# Patient Record
Sex: MALE | Race: WHITE | NOT HISPANIC OR LATINO
[De-identification: names, ages, dates, MRNs, and addresses within clinical notes are randomized per-mention and may not be internally consistent; named-entity substitution may affect disease eponyms.]

---

## 2017-03-08 ENCOUNTER — APPOINTMENT (OUTPATIENT)
Dept: VASCULAR SURGERY | Facility: CLINIC | Age: 57
End: 2017-03-08

## 2017-03-10 ENCOUNTER — APPOINTMENT (OUTPATIENT)
Dept: VASCULAR SURGERY | Facility: CLINIC | Age: 57
End: 2017-03-10

## 2017-03-29 ENCOUNTER — APPOINTMENT (OUTPATIENT)
Dept: VASCULAR SURGERY | Facility: CLINIC | Age: 57
End: 2017-03-29

## 2017-03-29 VITALS
SYSTOLIC BLOOD PRESSURE: 136 MMHG | HEART RATE: 73 BPM | BODY MASS INDEX: 33.33 KG/M2 | WEIGHT: 225 LBS | TEMPERATURE: 97.6 F | DIASTOLIC BLOOD PRESSURE: 84 MMHG | HEIGHT: 69 IN

## 2017-03-29 DIAGNOSIS — R60.0 LOCALIZED EDEMA: ICD-10-CM

## 2017-05-22 ENCOUNTER — APPOINTMENT (OUTPATIENT)
Dept: VASCULAR SURGERY | Facility: CLINIC | Age: 57
End: 2017-05-22

## 2017-05-22 ENCOUNTER — APPOINTMENT (OUTPATIENT)
Dept: SURGERY | Facility: CLINIC | Age: 57
End: 2017-05-22

## 2017-05-22 VITALS
TEMPERATURE: 98.6 F | DIASTOLIC BLOOD PRESSURE: 85 MMHG | WEIGHT: 225 LBS | HEIGHT: 69 IN | HEART RATE: 100 BPM | SYSTOLIC BLOOD PRESSURE: 133 MMHG | BODY MASS INDEX: 33.33 KG/M2

## 2017-05-22 DIAGNOSIS — K42.9 UMBILICAL HERNIA W/OUT OBSTRUCTION OR GANGRENE: ICD-10-CM

## 2017-05-22 DIAGNOSIS — E78.00 PURE HYPERCHOLESTEROLEMIA, UNSPECIFIED: ICD-10-CM

## 2017-05-25 ENCOUNTER — OUTPATIENT (OUTPATIENT)
Dept: OUTPATIENT SERVICES | Facility: HOSPITAL | Age: 57
LOS: 1 days | Discharge: HOME | End: 2017-05-25

## 2017-05-25 VITALS
DIASTOLIC BLOOD PRESSURE: 90 MMHG | WEIGHT: 238.1 LBS | HEIGHT: 69 IN | OXYGEN SATURATION: 100 % | TEMPERATURE: 97 F | SYSTOLIC BLOOD PRESSURE: 130 MMHG | RESPIRATION RATE: 18 BRPM | HEART RATE: 87 BPM

## 2017-05-25 DIAGNOSIS — F32.9 MAJOR DEPRESSIVE DISORDER, SINGLE EPISODE, UNSPECIFIED: ICD-10-CM

## 2017-05-25 DIAGNOSIS — I82.409 ACUTE EMBOLISM AND THROMBOSIS OF UNSPECIFIED DEEP VEINS OF UNSPECIFIED LOWER EXTREMITY: ICD-10-CM

## 2017-05-25 DIAGNOSIS — Z01.818 ENCOUNTER FOR OTHER PREPROCEDURAL EXAMINATION: ICD-10-CM

## 2017-05-25 DIAGNOSIS — K46.9 UNSPECIFIED ABDOMINAL HERNIA WITHOUT OBSTRUCTION OR GANGRENE: ICD-10-CM

## 2017-05-25 DIAGNOSIS — K21.9 GASTRO-ESOPHAGEAL REFLUX DISEASE WITHOUT ESOPHAGITIS: ICD-10-CM

## 2017-05-25 DIAGNOSIS — K42.9 UMBILICAL HERNIA WITHOUT OBSTRUCTION OR GANGRENE: ICD-10-CM

## 2017-05-25 DIAGNOSIS — E78.00 PURE HYPERCHOLESTEROLEMIA, UNSPECIFIED: ICD-10-CM

## 2017-05-25 LAB
ANION GAP SERPL CALC-SCNC: 10 MMOL/L — SIGNIFICANT CHANGE UP (ref 5–17)
BASOPHILS # BLD AUTO: 0.1 K/UL — SIGNIFICANT CHANGE UP (ref 0–0.2)
BASOPHILS NFR BLD AUTO: 1.7 % — SIGNIFICANT CHANGE UP (ref 0–2)
BUN SERPL-MCNC: 19 MG/DL — SIGNIFICANT CHANGE UP (ref 7–23)
CALCIUM SERPL-MCNC: 8.4 MG/DL — LOW (ref 8.5–10.1)
CHLORIDE SERPL-SCNC: 109 MMOL/L — HIGH (ref 96–108)
CO2 SERPL-SCNC: 22 MMOL/L — SIGNIFICANT CHANGE UP (ref 22–31)
CREAT SERPL-MCNC: 1.03 MG/DL — SIGNIFICANT CHANGE UP (ref 0.5–1.3)
EOSINOPHIL # BLD AUTO: 0.2 K/UL — SIGNIFICANT CHANGE UP (ref 0–0.5)
EOSINOPHIL NFR BLD AUTO: 3.7 % — SIGNIFICANT CHANGE UP (ref 0–6)
GLUCOSE SERPL-MCNC: 153 MG/DL — HIGH (ref 70–99)
HCT VFR BLD CALC: 43.3 % — SIGNIFICANT CHANGE UP (ref 39–50)
HGB BLD-MCNC: 15.3 G/DL — SIGNIFICANT CHANGE UP (ref 13–17)
HIV 1 & 2 AB SERPL IA.RAPID: SIGNIFICANT CHANGE UP
INR BLD: 0.92 RATIO — SIGNIFICANT CHANGE UP (ref 0.88–1.16)
LYMPHOCYTES # BLD AUTO: 1.8 K/UL — SIGNIFICANT CHANGE UP (ref 1–3.3)
LYMPHOCYTES # BLD AUTO: 31.7 % — SIGNIFICANT CHANGE UP (ref 13–44)
MCHC RBC-ENTMCNC: 31.7 PG — SIGNIFICANT CHANGE UP (ref 27–34)
MCHC RBC-ENTMCNC: 35.4 GM/DL — SIGNIFICANT CHANGE UP (ref 32–36)
MCV RBC AUTO: 89.5 FL — SIGNIFICANT CHANGE UP (ref 80–100)
MONOCYTES # BLD AUTO: 0.6 K/UL — SIGNIFICANT CHANGE UP (ref 0–0.9)
MONOCYTES NFR BLD AUTO: 10 % — SIGNIFICANT CHANGE UP (ref 2–14)
NEUTROPHILS # BLD AUTO: 3 K/UL — SIGNIFICANT CHANGE UP (ref 1.8–7.4)
NEUTROPHILS NFR BLD AUTO: 53 % — SIGNIFICANT CHANGE UP (ref 43–77)
PLATELET # BLD AUTO: 258 K/UL — SIGNIFICANT CHANGE UP (ref 150–400)
POTASSIUM SERPL-MCNC: 4.5 MMOL/L — SIGNIFICANT CHANGE UP (ref 3.5–5.3)
POTASSIUM SERPL-SCNC: 4.5 MMOL/L — SIGNIFICANT CHANGE UP (ref 3.5–5.3)
PROTHROM AB SERPL-ACNC: 10 SEC — SIGNIFICANT CHANGE UP (ref 9.8–12.7)
RBC # BLD: 4.83 M/UL — SIGNIFICANT CHANGE UP (ref 4.2–5.8)
RBC # FLD: 11 % — SIGNIFICANT CHANGE UP (ref 11–15)
SODIUM SERPL-SCNC: 141 MMOL/L — SIGNIFICANT CHANGE UP (ref 135–145)
WBC # BLD: 5.6 K/UL — SIGNIFICANT CHANGE UP (ref 3.8–10.5)
WBC # FLD AUTO: 5.6 K/UL — SIGNIFICANT CHANGE UP (ref 3.8–10.5)

## 2017-05-25 NOTE — H&P PST ADULT - PMH
Depressed    DVT (deep venous thrombosis)    GERD (gastroesophageal reflux disease)    Hypercholesteremia

## 2017-05-25 NOTE — H&P PST ADULT - NSANTHOSAYNRD_GEN_A_CORE
denies/No. CONNER screening performed.  STOP BANG Legend: 0-2 = LOW Risk; 3-4 = INTERMEDIATE Risk; 5-8 = HIGH Risk

## 2017-05-30 RX ORDER — SODIUM CHLORIDE 9 MG/ML
3 INJECTION INTRAMUSCULAR; INTRAVENOUS; SUBCUTANEOUS EVERY 8 HOURS
Qty: 0 | Refills: 0 | Status: DISCONTINUED | OUTPATIENT
Start: 2017-06-01 | End: 2017-06-01

## 2017-06-01 ENCOUNTER — OUTPATIENT (OUTPATIENT)
Dept: OUTPATIENT SERVICES | Facility: HOSPITAL | Age: 57
LOS: 1 days | Discharge: HOME | End: 2017-06-01
Payer: COMMERCIAL

## 2017-06-01 ENCOUNTER — TRANSCRIPTION ENCOUNTER (OUTPATIENT)
Age: 57
End: 2017-06-01

## 2017-06-01 ENCOUNTER — APPOINTMENT (OUTPATIENT)
Dept: SURGERY | Facility: HOSPITAL | Age: 57
End: 2017-06-01

## 2017-06-01 VITALS
SYSTOLIC BLOOD PRESSURE: 122 MMHG | OXYGEN SATURATION: 98 % | RESPIRATION RATE: 16 BRPM | HEART RATE: 66 BPM | DIASTOLIC BLOOD PRESSURE: 86 MMHG

## 2017-06-01 VITALS
HEIGHT: 69 IN | RESPIRATION RATE: 18 BRPM | WEIGHT: 238.1 LBS | SYSTOLIC BLOOD PRESSURE: 120 MMHG | HEART RATE: 68 BPM | DIASTOLIC BLOOD PRESSURE: 82 MMHG | TEMPERATURE: 97 F | OXYGEN SATURATION: 97 %

## 2017-06-01 PROCEDURE — 49568: CPT

## 2017-06-01 PROCEDURE — 49585: CPT | Mod: AS

## 2017-06-01 PROCEDURE — 49560: CPT

## 2017-06-01 RX ORDER — ACETAMINOPHEN 500 MG
1000 TABLET ORAL ONCE
Qty: 0 | Refills: 0 | Status: COMPLETED | OUTPATIENT
Start: 2017-06-01 | End: 2017-06-01

## 2017-06-01 RX ORDER — OXYCODONE HYDROCHLORIDE 5 MG/1
1 TABLET ORAL
Qty: 20 | Refills: 0
Start: 2017-06-01

## 2017-06-01 RX ORDER — FENTANYL CITRATE 50 UG/ML
50 INJECTION INTRAVENOUS
Qty: 0 | Refills: 0 | Status: DISCONTINUED | OUTPATIENT
Start: 2017-06-01 | End: 2017-06-01

## 2017-06-01 RX ORDER — SODIUM CHLORIDE 9 MG/ML
1000 INJECTION, SOLUTION INTRAVENOUS
Qty: 0 | Refills: 0 | Status: DISCONTINUED | OUTPATIENT
Start: 2017-06-01 | End: 2017-06-01

## 2017-06-01 RX ORDER — FENTANYL CITRATE 50 UG/ML
25 INJECTION INTRAVENOUS
Qty: 0 | Refills: 0 | Status: DISCONTINUED | OUTPATIENT
Start: 2017-06-01 | End: 2017-06-01

## 2017-06-01 RX ADMIN — FENTANYL CITRATE 25 MICROGRAM(S): 50 INJECTION INTRAVENOUS at 11:38

## 2017-06-01 RX ADMIN — Medication 400 MILLIGRAM(S): at 11:23

## 2017-06-01 RX ADMIN — Medication 1000 MILLIGRAM(S): at 11:53

## 2017-06-01 RX ADMIN — FENTANYL CITRATE 25 MICROGRAM(S): 50 INJECTION INTRAVENOUS at 11:23

## 2017-06-01 RX ADMIN — SODIUM CHLORIDE 75 MILLILITER(S): 9 INJECTION, SOLUTION INTRAVENOUS at 11:32

## 2017-06-01 NOTE — ASU DISCHARGE PLAN (ADULT/PEDIATRIC). - INSTRUCTIONS
may take over the counter stool softener when taking pain medication as needed for constipation please call his office to make an appointment

## 2017-06-01 NOTE — ASU DISCHARGE PLAN (ADULT/PEDIATRIC). - NOTIFY
Bleeding that does not stop/Numbness, color, or temperature change to extremity/Excessive Diarrhea/Inability to Tolerate Liquids or Foods/Numbness, tingling/Unable to Urinate/Fever greater than 101/Persistent Nausea and Vomiting/Swelling that continues/Increased Irritability or Sluggishness/Pain not relieved by Medications

## 2017-06-01 NOTE — ASU DISCHARGE PLAN (ADULT/PEDIATRIC). - MEDICATION SUMMARY - MEDICATIONS TO TAKE
I will START or STAY ON the medications listed below when I get home from the hospital:    acetaminophen-oxycodone 325 mg-5 mg oral tablet  -- 1 tab(s) by mouth every 6 hours, As Needed -for moderate pain MDD:4  -- Caution federal law prohibits the transfer of this drug to any person other  than the person for whom it was prescribed.  May cause drowsiness.  Alcohol may intensify this effect.  Use care when operating dangerous machinery.  This prescription cannot be refilled.  This product contains acetaminophen.  Do not use  with any other product containing acetaminophen to prevent possible liver damage.  Using more of this medication than prescribed may cause serious breathing problems.    -- Indication: For pain    sertraline 50 mg oral tablet  -- 1 tab(s) by mouth once a day  -- Indication: For depression    simvastatin 20 mg oral tablet  -- 1 tab(s) by mouth once a day  -- Indication: For hyperlipidemia     omeprazole 20 mg oral delayed release capsule  -- 1 cap(s) by mouth once a day, As Needed  -- Indication: For gerd

## 2017-06-06 ENCOUNTER — APPOINTMENT (OUTPATIENT)
Dept: MRI IMAGING | Facility: CLINIC | Age: 57
End: 2017-06-06

## 2017-06-06 DIAGNOSIS — E66.9 OBESITY, UNSPECIFIED: ICD-10-CM

## 2017-06-06 DIAGNOSIS — E78.00 PURE HYPERCHOLESTEROLEMIA, UNSPECIFIED: ICD-10-CM

## 2017-06-06 DIAGNOSIS — K21.9 GASTRO-ESOPHAGEAL REFLUX DISEASE WITHOUT ESOPHAGITIS: ICD-10-CM

## 2017-06-06 DIAGNOSIS — F32.9 MAJOR DEPRESSIVE DISORDER, SINGLE EPISODE, UNSPECIFIED: ICD-10-CM

## 2017-06-06 DIAGNOSIS — K43.9 VENTRAL HERNIA WITHOUT OBSTRUCTION OR GANGRENE: ICD-10-CM

## 2017-06-06 DIAGNOSIS — K42.9 UMBILICAL HERNIA WITHOUT OBSTRUCTION OR GANGRENE: ICD-10-CM

## 2017-06-11 ENCOUNTER — FORM ENCOUNTER (OUTPATIENT)
Age: 57
End: 2017-06-11

## 2017-06-12 ENCOUNTER — APPOINTMENT (OUTPATIENT)
Dept: MRI IMAGING | Facility: CLINIC | Age: 57
End: 2017-06-12

## 2017-06-12 ENCOUNTER — OUTPATIENT (OUTPATIENT)
Dept: OUTPATIENT SERVICES | Facility: HOSPITAL | Age: 57
LOS: 1 days | End: 2017-06-12
Payer: COMMERCIAL

## 2017-06-12 DIAGNOSIS — R60.0 LOCALIZED EDEMA: ICD-10-CM

## 2017-06-12 PROCEDURE — C8920: CPT

## 2017-06-12 PROCEDURE — C8902: CPT

## 2017-06-12 PROCEDURE — A9585: CPT

## 2017-06-14 ENCOUNTER — APPOINTMENT (OUTPATIENT)
Dept: SURGERY | Facility: CLINIC | Age: 57
End: 2017-06-14

## 2017-06-14 VITALS
BODY MASS INDEX: 29.62 KG/M2 | WEIGHT: 200 LBS | HEART RATE: 85 BPM | TEMPERATURE: 97.4 F | SYSTOLIC BLOOD PRESSURE: 137 MMHG | HEIGHT: 69 IN | DIASTOLIC BLOOD PRESSURE: 91 MMHG

## 2017-06-14 DIAGNOSIS — M79.89 OTHER SPECIFIED SOFT TISSUE DISORDERS: ICD-10-CM

## 2017-06-14 DIAGNOSIS — K46.9 UNSPECIFIED ABDOMINAL HERNIA W/OUT OBSTRUCTION OR GANGRENE: ICD-10-CM

## 2017-07-05 ENCOUNTER — APPOINTMENT (OUTPATIENT)
Dept: SURGERY | Facility: CLINIC | Age: 57
End: 2017-07-05

## 2018-08-01 ENCOUNTER — RX RENEWAL (OUTPATIENT)
Age: 58
End: 2018-08-01

## 2018-10-19 PROBLEM — F32.9 MAJOR DEPRESSIVE DISORDER, SINGLE EPISODE, UNSPECIFIED: Chronic | Status: ACTIVE | Noted: 2017-05-25

## 2018-10-19 PROBLEM — K21.9 GASTRO-ESOPHAGEAL REFLUX DISEASE WITHOUT ESOPHAGITIS: Chronic | Status: ACTIVE | Noted: 2017-05-25

## 2018-10-19 PROBLEM — E78.00 PURE HYPERCHOLESTEROLEMIA, UNSPECIFIED: Chronic | Status: ACTIVE | Noted: 2017-05-25

## 2018-10-19 PROBLEM — I82.409 ACUTE EMBOLISM AND THROMBOSIS OF UNSPECIFIED DEEP VEINS OF UNSPECIFIED LOWER EXTREMITY: Chronic | Status: ACTIVE | Noted: 2017-05-25

## 2018-11-24 ENCOUNTER — RECORD ABSTRACTING (OUTPATIENT)
Age: 58
End: 2018-11-24

## 2018-11-26 ENCOUNTER — APPOINTMENT (OUTPATIENT)
Dept: PULMONOLOGY | Facility: CLINIC | Age: 58
End: 2018-11-26
Payer: COMMERCIAL

## 2018-11-26 ENCOUNTER — LABORATORY RESULT (OUTPATIENT)
Age: 58
End: 2018-11-26

## 2018-11-26 ENCOUNTER — NON-APPOINTMENT (OUTPATIENT)
Age: 58
End: 2018-11-26

## 2018-11-26 VITALS
TEMPERATURE: 97.8 F | OXYGEN SATURATION: 94 % | RESPIRATION RATE: 12 BRPM | DIASTOLIC BLOOD PRESSURE: 95 MMHG | HEART RATE: 94 BPM | SYSTOLIC BLOOD PRESSURE: 130 MMHG

## 2018-11-26 VITALS — WEIGHT: 238 LBS | HEIGHT: 67 IN | BODY MASS INDEX: 37.35 KG/M2

## 2018-11-26 VITALS — SYSTOLIC BLOOD PRESSURE: 125 MMHG | DIASTOLIC BLOOD PRESSURE: 88 MMHG

## 2018-11-26 DIAGNOSIS — Z23 ENCOUNTER FOR IMMUNIZATION: ICD-10-CM

## 2018-11-26 DIAGNOSIS — N52.9 MALE ERECTILE DYSFUNCTION, UNSPECIFIED: ICD-10-CM

## 2018-11-26 DIAGNOSIS — R06.83 SNORING: ICD-10-CM

## 2018-11-26 LAB
ALBUMIN: 10
BILIRUB UR QL STRIP: NEGATIVE
CLARITY UR: CLEAR
COLLECTION METHOD: NORMAL
CREATININE: 200
GLUCOSE UR-MCNC: NEGATIVE
HCG UR QL: 0.2 EU/DL
HGB UR QL STRIP.AUTO: NEGATIVE
KETONES UR-MCNC: NEGATIVE
LEUKOCYTE ESTERASE UR QL STRIP: NEGATIVE
MICROALBUMIN/CREAT UR TEST STR-RTO: 30
NITRITE UR QL STRIP: NEGATIVE
PH UR STRIP: 5.5
PROT UR STRIP-MCNC: NEGATIVE
SP GR UR STRIP: 1.02

## 2018-11-26 PROCEDURE — 93000 ELECTROCARDIOGRAM COMPLETE: CPT

## 2018-11-26 PROCEDURE — 82044 UR ALBUMIN SEMIQUANTITATIVE: CPT | Mod: QW

## 2018-11-26 PROCEDURE — 81003 URINALYSIS AUTO W/O SCOPE: CPT | Mod: NC,QW

## 2018-11-26 PROCEDURE — 90686 IIV4 VACC NO PRSV 0.5 ML IM: CPT

## 2018-11-26 PROCEDURE — 99396 PREV VISIT EST AGE 40-64: CPT | Mod: 25

## 2018-11-26 PROCEDURE — 82570 ASSAY OF URINE CREATININE: CPT | Mod: QW

## 2018-11-26 PROCEDURE — 36415 COLL VENOUS BLD VENIPUNCTURE: CPT

## 2018-11-26 PROCEDURE — G0008: CPT

## 2018-12-03 ENCOUNTER — APPOINTMENT (OUTPATIENT)
Dept: PULMONOLOGY | Facility: CLINIC | Age: 58
End: 2018-12-03
Payer: COMMERCIAL

## 2018-12-03 PROCEDURE — 95800 SLP STDY UNATTENDED: CPT | Mod: 52

## 2018-12-04 ENCOUNTER — APPOINTMENT (OUTPATIENT)
Dept: GASTROENTEROLOGY | Facility: CLINIC | Age: 58
End: 2018-12-04
Payer: COMMERCIAL

## 2018-12-04 VITALS
BODY MASS INDEX: 35.47 KG/M2 | TEMPERATURE: 97.9 F | DIASTOLIC BLOOD PRESSURE: 78 MMHG | HEART RATE: 103 BPM | SYSTOLIC BLOOD PRESSURE: 130 MMHG | HEIGHT: 67 IN | WEIGHT: 226 LBS | OXYGEN SATURATION: 98 %

## 2018-12-04 DIAGNOSIS — K21.9 GASTRO-ESOPHAGEAL REFLUX DISEASE W/OUT ESOPHAGITIS: ICD-10-CM

## 2018-12-04 DIAGNOSIS — Z78.9 OTHER SPECIFIED HEALTH STATUS: ICD-10-CM

## 2018-12-04 DIAGNOSIS — Z12.11 ENCOUNTER FOR SCREENING FOR MALIGNANT NEOPLASM OF COLON: ICD-10-CM

## 2018-12-04 DIAGNOSIS — Z82.49 FAMILY HISTORY OF ISCHEMIC HEART DISEASE AND OTHER DISEASES OF THE CIRCULATORY SYSTEM: ICD-10-CM

## 2018-12-04 PROCEDURE — 95800 SLP STDY UNATTENDED: CPT | Mod: 52

## 2018-12-04 PROCEDURE — 99244 OFF/OP CNSLTJ NEW/EST MOD 40: CPT

## 2018-12-05 LAB
25(OH)D3 SERPL-MCNC: 27.4 NG/ML
ALBUMIN SERPL ELPH-MCNC: 4.8 G/DL
ALP BLD-CCNC: 71 U/L
ALT SERPL-CCNC: 24 U/L
ANION GAP SERPL CALC-SCNC: 12 MMOL/L
AST SERPL-CCNC: 20 U/L
BASOPHILS # BLD AUTO: 0.05 K/UL
BASOPHILS NFR BLD AUTO: 0.7 %
BILIRUB DIRECT SERPL-MCNC: 0.1 MG/DL
BILIRUB INDIRECT SERPL-MCNC: 0.2 MG/DL
BILIRUB SERPL-MCNC: 0.3 MG/DL
BUN SERPL-MCNC: 18 MG/DL
CALCIUM SERPL-MCNC: 10 MG/DL
CHLORIDE SERPL-SCNC: 105 MMOL/L
CHOLEST SERPL-MCNC: 201 MG/DL
CHOLEST/HDLC SERPL: 4.7 RATIO
CO2 SERPL-SCNC: 26 MMOL/L
CREAT SERPL-MCNC: 0.89 MG/DL
EOSINOPHIL # BLD AUTO: 0.1 K/UL
EOSINOPHIL NFR BLD AUTO: 1.4 %
GLUCOSE SERPL-MCNC: 90 MG/DL
HBA1C MFR BLD HPLC: 6 %
HCT VFR BLD CALC: 45.1 %
HCV AB SER QL: NONREACTIVE
HCV S/CO RATIO: 0.11 S/CO
HDLC SERPL-MCNC: 43 MG/DL
HGB BLD-MCNC: 14.8 G/DL
IMM GRANULOCYTES NFR BLD AUTO: 0.1 %
LDLC SERPL CALC-MCNC: 117 MG/DL
LYMPHOCYTES # BLD AUTO: 2.19 K/UL
LYMPHOCYTES NFR BLD AUTO: 31.2 %
MAN DIFF?: NORMAL
MCHC RBC-ENTMCNC: 30.1 PG
MCHC RBC-ENTMCNC: 32.8 GM/DL
MCV RBC AUTO: 91.9 FL
MONOCYTES # BLD AUTO: 0.58 K/UL
MONOCYTES NFR BLD AUTO: 8.3 %
NEUTROPHILS # BLD AUTO: 4.09 K/UL
NEUTROPHILS NFR BLD AUTO: 58.3 %
PLATELET # BLD AUTO: 278 K/UL
POTASSIUM SERPL-SCNC: 4.6 MMOL/L
PROT SERPL-MCNC: 7.2 G/DL
PSA SERPL-MCNC: 0.93 NG/ML
RBC # BLD: 4.91 M/UL
RBC # FLD: 12.5 %
SODIUM SERPL-SCNC: 143 MMOL/L
T3 SERPL-MCNC: 105 NG/DL
T3RU NFR SERPL: 1.08 INDEX
T4 FREE SERPL-MCNC: 1 NG/DL
T4 SERPL-MCNC: 4.6 UG/DL
TESTOST BND SERPL-MCNC: 5.1 PG/ML
TESTOST SERPL-MCNC: 328.6 NG/DL
TRIGL SERPL-MCNC: 205 MG/DL
TSH SERPL-ACNC: 1.27 UIU/ML
WBC # FLD AUTO: 7.02 K/UL

## 2018-12-15 PROBLEM — Z80.42 FAMILY HISTORY OF MALIGNANT NEOPLASM OF PROSTATE: Status: ACTIVE | Noted: 2018-12-15

## 2018-12-15 PROBLEM — Z11.59 ENCOUNTER FOR HCV SCREENING TEST FOR LOW RISK PATIENT: Status: ACTIVE | Noted: 2018-12-15

## 2018-12-15 PROBLEM — E78.5 OTHER AND UNSPECIFIED HYPERLIPIDEMIA: Status: ACTIVE | Noted: 2018-12-15

## 2018-12-15 PROBLEM — Z83.71 FAMILY HISTORY OF COLONIC POLYPS: Status: ACTIVE | Noted: 2018-12-15

## 2018-12-18 ENCOUNTER — APPOINTMENT (OUTPATIENT)
Dept: PULMONOLOGY | Facility: CLINIC | Age: 58
End: 2018-12-18

## 2018-12-18 ENCOUNTER — FORM ENCOUNTER (OUTPATIENT)
Age: 58
End: 2018-12-18

## 2018-12-19 ENCOUNTER — APPOINTMENT (OUTPATIENT)
Dept: PULMONOLOGY | Facility: CLINIC | Age: 58
End: 2018-12-19
Payer: COMMERCIAL

## 2018-12-19 VITALS
RESPIRATION RATE: 12 BRPM | DIASTOLIC BLOOD PRESSURE: 91 MMHG | HEART RATE: 114 BPM | OXYGEN SATURATION: 95 % | SYSTOLIC BLOOD PRESSURE: 138 MMHG

## 2018-12-19 DIAGNOSIS — Z83.71 FAMILY HISTORY OF COLONIC POLYPS: ICD-10-CM

## 2018-12-19 DIAGNOSIS — Z11.59 ENCOUNTER FOR SCREENING FOR OTHER VIRAL DISEASES: ICD-10-CM

## 2018-12-19 DIAGNOSIS — Z80.42 FAMILY HISTORY OF MALIGNANT NEOPLASM OF PROSTATE: ICD-10-CM

## 2018-12-19 DIAGNOSIS — E78.5 HYPERLIPIDEMIA, UNSPECIFIED: ICD-10-CM

## 2018-12-19 PROCEDURE — 99213 OFFICE O/P EST LOW 20 MIN: CPT

## 2019-01-23 ENCOUNTER — APPOINTMENT (OUTPATIENT)
Dept: GASTROENTEROLOGY | Facility: AMBULATORY MEDICAL SERVICES | Age: 59
End: 2019-01-23
Payer: COMMERCIAL

## 2019-01-23 PROCEDURE — 45378 DIAGNOSTIC COLONOSCOPY: CPT

## 2019-02-25 ENCOUNTER — APPOINTMENT (OUTPATIENT)
Dept: PULMONOLOGY | Facility: CLINIC | Age: 59
End: 2019-02-25
Payer: COMMERCIAL

## 2019-02-25 VITALS
HEIGHT: 69 IN | OXYGEN SATURATION: 95 % | WEIGHT: 225 LBS | DIASTOLIC BLOOD PRESSURE: 93 MMHG | SYSTOLIC BLOOD PRESSURE: 133 MMHG | BODY MASS INDEX: 33.33 KG/M2 | HEART RATE: 98 BPM | RESPIRATION RATE: 16 BRPM

## 2019-02-25 PROCEDURE — 99213 OFFICE O/P EST LOW 20 MIN: CPT

## 2019-02-25 NOTE — HISTORY OF PRESENT ILLNESS
[FreeTextEntry1] : Patient has his new auto cpap machine and having some difficulties with usage. Wife says he makes noises through his mouth while wearing nasal pillows\par \par Does feel better and has less daytime sleepiness \par \par Did not take machine with him on vacation for 1 week

## 2019-02-25 NOTE — PROCEDURE
[FreeTextEntry1] : cpap data downloaded and discussed with patient\par \par \par will check patient with a home oximetry on cpap

## 2019-02-25 NOTE — DISCUSSION/SUMMARY
[FreeTextEntry1] : Patient compliant to CPAP therapy and having positive clinical response to treatment. Will change setting s to 8-15 to see if helps with AHI\par \par encouraged to use nightly and bring on vacation. Next mask will change to full face mask

## 2019-05-03 ENCOUNTER — RX RENEWAL (OUTPATIENT)
Age: 59
End: 2019-05-03

## 2019-05-22 ENCOUNTER — OTHER (OUTPATIENT)
Age: 59
End: 2019-05-22

## 2019-05-22 ENCOUNTER — APPOINTMENT (OUTPATIENT)
Dept: PULMONOLOGY | Facility: CLINIC | Age: 59
End: 2019-05-22
Payer: COMMERCIAL

## 2019-05-22 VITALS
OXYGEN SATURATION: 95 % | SYSTOLIC BLOOD PRESSURE: 130 MMHG | HEART RATE: 96 BPM | DIASTOLIC BLOOD PRESSURE: 90 MMHG | RESPIRATION RATE: 16 BRPM

## 2019-05-22 VITALS — WEIGHT: 225 LBS | BODY MASS INDEX: 33.33 KG/M2 | HEIGHT: 69 IN

## 2019-05-22 PROCEDURE — 99213 OFFICE O/P EST LOW 20 MIN: CPT

## 2019-05-22 RX ORDER — SODIUM SULFATE, POTASSIUM SULFATE, MAGNESIUM SULFATE 17.5; 3.13; 1.6 G/ML; G/ML; G/ML
17.5-3.13-1.6 SOLUTION, CONCENTRATE ORAL
Qty: 1 | Refills: 0 | Status: DISCONTINUED | COMMUNITY
Start: 2018-12-04 | End: 2019-05-22

## 2019-05-22 NOTE — PHYSICAL EXAM
[Normal Conjunctiva] : the conjunctiva exhibited no abnormalities [Normal Oropharynx] : normal oropharynx [Jugular Venous Distention Increased] : there was no jugular-venous distention [Heart Sounds] : normal S1 and S2 [Murmurs] : no murmurs present [Auscultation Breath Sounds / Voice Sounds] : lungs were clear to auscultation bilaterally [Lungs Percussion] : the lungs were normal to percussion [Abdomen Soft] : soft [Abdomen Tenderness] : non-tender [Abdomen Mass (___ Cm)] : no abdominal mass palpated [Nail Clubbing] : no clubbing of the fingernails [Cyanosis, Localized] : no localized cyanosis [Petechial Hemorrhages (___cm)] : no petechial hemorrhages [] : no ischemic changes [FreeTextEntry1] : Boot right

## 2019-05-22 NOTE — HISTORY OF PRESENT ILLNESS
[FreeTextEntry1] : Having surgery Friday for fractured right foot 5th metatarsal . Feeling well\par \par For f/u re CONNER.\par When did oximetry on CPAP was not wearing CPAP properly.\par Now compliant to CPAP

## 2019-05-22 NOTE — ASSESSMENT
[FreeTextEntry1] : Medical problems as above. Medical status maximized. No medical contraindications to surgery.\par Pt with CONNER. Anesthesia should be aware of CONNER and take CONNER precautions.\par \par Fax

## 2019-05-22 NOTE — REASON FOR VISIT
[Follow-Up] : a follow-up visit [FreeTextEntry2] : medical clearance for right 5th metatarsal repair, [facility]

## 2019-06-03 ENCOUNTER — APPOINTMENT (OUTPATIENT)
Dept: PULMONOLOGY | Facility: CLINIC | Age: 59
End: 2019-06-03

## 2019-06-25 ENCOUNTER — APPOINTMENT (OUTPATIENT)
Dept: PULMONOLOGY | Facility: CLINIC | Age: 59
End: 2019-06-25
Payer: COMMERCIAL

## 2019-06-25 VITALS
OXYGEN SATURATION: 97 % | TEMPERATURE: 98.4 F | DIASTOLIC BLOOD PRESSURE: 72 MMHG | WEIGHT: 225 LBS | SYSTOLIC BLOOD PRESSURE: 108 MMHG | RESPIRATION RATE: 16 BRPM | BODY MASS INDEX: 33.33 KG/M2 | HEIGHT: 69 IN | HEART RATE: 107 BPM

## 2019-06-25 DIAGNOSIS — Z87.09 PERSONAL HISTORY OF OTHER DISEASES OF THE RESPIRATORY SYSTEM: ICD-10-CM

## 2019-06-25 LAB — S PYO AG SPEC QL IA: NEGATIVE

## 2019-06-25 PROCEDURE — 99213 OFFICE O/P EST LOW 20 MIN: CPT | Mod: 25

## 2019-06-25 PROCEDURE — 87880 STREP A ASSAY W/OPTIC: CPT | Mod: QW

## 2019-06-25 PROCEDURE — 36415 COLL VENOUS BLD VENIPUNCTURE: CPT

## 2019-06-25 NOTE — REASON FOR VISIT
[Follow-Up] : a follow-up visit [Sleep Apnea] : sleep apnea [FreeTextEntry2] : still in boot after sx, slowly getting better

## 2019-06-25 NOTE — PHYSICAL EXAM
[General Appearance - Well Nourished] : well nourished [Heart Sounds] : normal S1 and S2 [General Appearance - Well Developed] : well developed [Murmurs] : no murmurs present [Auscultation Breath Sounds / Voice Sounds] : lungs were clear to auscultation bilaterally [Abdomen Soft] : soft [Abdomen Tenderness] : non-tender [Abdomen Mass (___ Cm)] : no abdominal mass palpated [Cyanosis, Localized] : no localized cyanosis [Petechial Hemorrhages (___cm)] : no petechial hemorrhages [Nail Clubbing] : no clubbing of the fingernails [] : no ischemic changes [FreeTextEntry1] : Pharynx injected.

## 2019-06-25 NOTE — HISTORY OF PRESENT ILLNESS
[FreeTextEntry1] : Recently lost son to heroin overdose. past 2 days had trouble getting out of bed and has a sore throat. Had a lot of stress with end of life and

## 2019-06-25 NOTE — DISCUSSION/SUMMARY
[FreeTextEntry1] : Patient compliant to CPAP therapy and having positive clinical response to treatment. Continue present settings.\par \par \par will repeat apnea O2 at night with cpap to retest o2

## 2019-06-26 ENCOUNTER — OTHER (OUTPATIENT)
Age: 59
End: 2019-06-26

## 2019-06-26 LAB
BASOPHILS # BLD AUTO: 0.05 K/UL
BASOPHILS NFR BLD AUTO: 0.5 %
EOSINOPHIL # BLD AUTO: 0.08 K/UL
EOSINOPHIL NFR BLD AUTO: 0.9 %
HCT VFR BLD CALC: 42.9 %
HGB BLD-MCNC: 14.2 G/DL
IMM GRANULOCYTES NFR BLD AUTO: 0.2 %
LYMPHOCYTES # BLD AUTO: 1.8 K/UL
LYMPHOCYTES NFR BLD AUTO: 19.6 %
MAN DIFF?: NORMAL
MCHC RBC-ENTMCNC: 30 PG
MCHC RBC-ENTMCNC: 33.1 GM/DL
MCV RBC AUTO: 90.5 FL
MONOCYTES # BLD AUTO: 1.19 K/UL
MONOCYTES NFR BLD AUTO: 12.9 %
NEUTROPHILS # BLD AUTO: 6.05 K/UL
NEUTROPHILS NFR BLD AUTO: 65.9 %
PLATELET # BLD AUTO: 253 K/UL
RBC # BLD: 4.74 M/UL
RBC # FLD: 11.6 %
WBC # FLD AUTO: 9.19 K/UL

## 2019-07-22 ENCOUNTER — APPOINTMENT (OUTPATIENT)
Dept: VASCULAR SURGERY | Facility: CLINIC | Age: 59
End: 2019-07-22
Payer: COMMERCIAL

## 2019-07-22 VITALS
BODY MASS INDEX: 33.33 KG/M2 | SYSTOLIC BLOOD PRESSURE: 117 MMHG | WEIGHT: 225 LBS | HEART RATE: 108 BPM | HEIGHT: 69 IN | DIASTOLIC BLOOD PRESSURE: 82 MMHG

## 2019-07-22 PROCEDURE — 99213 OFFICE O/P EST LOW 20 MIN: CPT

## 2019-07-22 PROCEDURE — 93970 EXTREMITY STUDY: CPT

## 2019-07-30 ENCOUNTER — APPOINTMENT (OUTPATIENT)
Dept: VASCULAR SURGERY | Facility: CLINIC | Age: 59
End: 2019-07-30
Payer: COMMERCIAL

## 2019-07-30 VITALS
HEART RATE: 101 BPM | DIASTOLIC BLOOD PRESSURE: 96 MMHG | TEMPERATURE: 97.9 F | BODY MASS INDEX: 33.33 KG/M2 | SYSTOLIC BLOOD PRESSURE: 144 MMHG | HEIGHT: 69 IN | WEIGHT: 225 LBS | OXYGEN SATURATION: 97 %

## 2019-07-30 PROCEDURE — 99213 OFFICE O/P EST LOW 20 MIN: CPT

## 2019-08-05 ENCOUNTER — RX RENEWAL (OUTPATIENT)
Age: 59
End: 2019-08-05

## 2019-09-09 ENCOUNTER — APPOINTMENT (OUTPATIENT)
Dept: VASCULAR SURGERY | Facility: CLINIC | Age: 59
End: 2019-09-09

## 2019-09-12 NOTE — ASU PATIENT PROFILE, ADULT - PSH
Size Of Lesion: 6mm pink firm papule Size Of Lesion: 1cm pink firm nodule Detail Level: Detailed No significant past surgical history

## 2019-10-14 NOTE — H&P PST ADULT - URINARY CATHETER
Pt ambulated from bedside<>hallway ~25ftx2 - No AEs nec - very steady, No LOB/SOB ++tachycardic 112<>145 (MD team made aware for assessment) Pt asymptomatic throughout "I'm doing ok, I feel normal'
no

## 2019-11-05 ENCOUNTER — APPOINTMENT (OUTPATIENT)
Dept: VASCULAR SURGERY | Facility: CLINIC | Age: 59
End: 2019-11-05
Payer: COMMERCIAL

## 2019-11-05 ENCOUNTER — RX RENEWAL (OUTPATIENT)
Age: 59
End: 2019-11-05

## 2019-11-05 VITALS
SYSTOLIC BLOOD PRESSURE: 117 MMHG | OXYGEN SATURATION: 96 % | HEIGHT: 69 IN | TEMPERATURE: 98.5 F | DIASTOLIC BLOOD PRESSURE: 76 MMHG | HEART RATE: 75 BPM | BODY MASS INDEX: 0.15 KG/M2 | WEIGHT: 1 LBS

## 2019-11-05 PROCEDURE — 93971 EXTREMITY STUDY: CPT

## 2019-11-05 PROCEDURE — 99213 OFFICE O/P EST LOW 20 MIN: CPT

## 2019-11-12 ENCOUNTER — APPOINTMENT (OUTPATIENT)
Dept: VASCULAR SURGERY | Facility: CLINIC | Age: 59
End: 2019-11-12
Payer: COMMERCIAL

## 2019-11-12 PROCEDURE — 99213 OFFICE O/P EST LOW 20 MIN: CPT

## 2019-11-12 PROCEDURE — 93971 EXTREMITY STUDY: CPT

## 2019-12-03 ENCOUNTER — APPOINTMENT (OUTPATIENT)
Dept: VASCULAR SURGERY | Facility: CLINIC | Age: 59
End: 2019-12-03
Payer: COMMERCIAL

## 2019-12-03 PROCEDURE — 99213 OFFICE O/P EST LOW 20 MIN: CPT

## 2019-12-03 PROCEDURE — 93971 EXTREMITY STUDY: CPT

## 2019-12-17 ENCOUNTER — APPOINTMENT (OUTPATIENT)
Dept: PULMONOLOGY | Facility: CLINIC | Age: 59
End: 2019-12-17

## 2020-02-04 ENCOUNTER — APPOINTMENT (OUTPATIENT)
Dept: VASCULAR SURGERY | Facility: CLINIC | Age: 60
End: 2020-02-04
Payer: COMMERCIAL

## 2020-02-04 ENCOUNTER — APPOINTMENT (OUTPATIENT)
Dept: PULMONOLOGY | Facility: CLINIC | Age: 60
End: 2020-02-04
Payer: COMMERCIAL

## 2020-02-04 ENCOUNTER — NON-APPOINTMENT (OUTPATIENT)
Age: 60
End: 2020-02-04

## 2020-02-04 ENCOUNTER — LABORATORY RESULT (OUTPATIENT)
Age: 60
End: 2020-02-04

## 2020-02-04 VITALS — OXYGEN SATURATION: 96 % | SYSTOLIC BLOOD PRESSURE: 128 MMHG | DIASTOLIC BLOOD PRESSURE: 86 MMHG | HEART RATE: 104 BPM

## 2020-02-04 DIAGNOSIS — I83.892 VARICOSE VEINS OF LEFT LOWER EXTREMITY WITH OTHER COMPLICATIONS: ICD-10-CM

## 2020-02-04 DIAGNOSIS — G47.19 OTHER HYPERSOMNIA: ICD-10-CM

## 2020-02-04 DIAGNOSIS — R05 COUGH: ICD-10-CM

## 2020-02-04 PROCEDURE — 93000 ELECTROCARDIOGRAM COMPLETE: CPT

## 2020-02-04 PROCEDURE — 99212 OFFICE O/P EST SF 10 MIN: CPT

## 2020-02-04 PROCEDURE — 93971 EXTREMITY STUDY: CPT

## 2020-02-04 PROCEDURE — 36415 COLL VENOUS BLD VENIPUNCTURE: CPT

## 2020-02-04 PROCEDURE — 99396 PREV VISIT EST AGE 40-64: CPT | Mod: 25

## 2020-02-04 RX ORDER — DICLOFENAC SODIUM 75 MG/1
75 TABLET, DELAYED RELEASE ORAL
Qty: 30 | Refills: 1 | Status: DISCONTINUED | COMMUNITY
Start: 2019-07-22 | End: 2020-02-04

## 2020-02-04 RX ORDER — RIVAROXABAN 15 MG-20MG
15 & 20 KIT ORAL
Qty: 1 | Refills: 0 | Status: DISCONTINUED | COMMUNITY
Start: 2019-11-12 | End: 2020-02-04

## 2020-02-04 RX ORDER — AZITHROMYCIN 250 MG/1
250 TABLET, FILM COATED ORAL
Qty: 1 | Refills: 0 | Status: DISCONTINUED | COMMUNITY
Start: 2019-06-26 | End: 2020-02-04

## 2020-02-05 LAB
25(OH)D3 SERPL-MCNC: 27.5 NG/ML
ALBUMIN SERPL ELPH-MCNC: 4.9 G/DL
ALP BLD-CCNC: 75 U/L
ALT SERPL-CCNC: 25 U/L
ANION GAP SERPL CALC-SCNC: 14 MMOL/L
AST SERPL-CCNC: 20 U/L
BASOPHILS # BLD AUTO: 0.07 K/UL
BASOPHILS NFR BLD AUTO: 0.9 %
BILIRUB DIRECT SERPL-MCNC: 0.1 MG/DL
BILIRUB INDIRECT SERPL-MCNC: 0.2 MG/DL
BILIRUB SERPL-MCNC: 0.3 MG/DL
BUN SERPL-MCNC: 16 MG/DL
CALCIUM SERPL-MCNC: 9.9 MG/DL
CHLORIDE SERPL-SCNC: 102 MMOL/L
CHOLEST SERPL-MCNC: 190 MG/DL
CHOLEST/HDLC SERPL: 4.7 RATIO
CO2 SERPL-SCNC: 23 MMOL/L
CREAT SERPL-MCNC: 1.03 MG/DL
EOSINOPHIL # BLD AUTO: 0.18 K/UL
EOSINOPHIL NFR BLD AUTO: 2.4 %
ESTIMATED AVERAGE GLUCOSE: 126 MG/DL
GLUCOSE SERPL-MCNC: 90 MG/DL
HBA1C MFR BLD HPLC: 6 %
HCT VFR BLD CALC: 45.2 %
HCV AB SER QL: NONREACTIVE
HCV S/CO RATIO: 0.15 S/CO
HDLC SERPL-MCNC: 40 MG/DL
HGB BLD-MCNC: 14.8 G/DL
IMM GRANULOCYTES NFR BLD AUTO: 0.3 %
LDLC SERPL CALC-MCNC: 111 MG/DL
LYMPHOCYTES # BLD AUTO: 2.28 K/UL
LYMPHOCYTES NFR BLD AUTO: 30.4 %
MAN DIFF?: NORMAL
MCHC RBC-ENTMCNC: 30.5 PG
MCHC RBC-ENTMCNC: 32.7 GM/DL
MCV RBC AUTO: 93.2 FL
MONOCYTES # BLD AUTO: 0.76 K/UL
MONOCYTES NFR BLD AUTO: 10.1 %
NEUTROPHILS # BLD AUTO: 4.18 K/UL
NEUTROPHILS NFR BLD AUTO: 55.9 %
PLATELET # BLD AUTO: 277 K/UL
POTASSIUM SERPL-SCNC: 4.5 MMOL/L
PROT SERPL-MCNC: 7.5 G/DL
PSA SERPL-MCNC: 1.33 NG/ML
RBC # BLD: 4.85 M/UL
RBC # FLD: 12.2 %
SODIUM SERPL-SCNC: 138 MMOL/L
T3 SERPL-MCNC: 114 NG/DL
T3RU NFR SERPL: 1 TBI
T4 FREE SERPL-MCNC: 0.9 NG/DL
T4 SERPL-MCNC: 4.2 UG/DL
TRIGL SERPL-MCNC: 191 MG/DL
TSH SERPL-ACNC: 1.27 UIU/ML
WBC # FLD AUTO: 7.49 K/UL

## 2020-02-05 NOTE — ASSESSMENT
[FreeTextEntry1] : Weight loss recommended and discussed.\par Labs drawn in office today\par Follow BP\par Medications reviewed and renewed.\par \par \par \par \par

## 2020-02-05 NOTE — PHYSICAL EXAM
[General Appearance - Well Developed] : well developed [General Appearance - Well Nourished] : well nourished [Jugular Venous Distention Increased] : there was no jugular-venous distention [Heart Sounds] : normal S1 and S2 [Edema] : no peripheral edema present [Abdomen Soft] : soft [Auscultation Breath Sounds / Voice Sounds] : lungs were clear to auscultation bilaterally [Abdomen Tenderness] : non-tender [] : no hepato-splenomegaly [Nail Clubbing] : no clubbing of the fingernails [Cyanosis, Localized] : no localized cyanosis [No Acute Distress] : no acute distress [Normal Oropharynx] : normal oropharynx [Supple] : supple [Normal Appearance] : normal appearance [No Neck Mass] : no neck mass [No JVD] : no jvd [Thyroid Not Enlarged] : thyroid not enlarged [Clear to Auscultation Bilaterally] : clear to auscultation bilaterally [Normal S1, S2] : normal s1, s2 [No Murmurs] : no murmurs [Normal to Percussion] : normal to percussion [Benign] : benign [Not Tender] : not tender [No HSM] : no hsm [No Masses] : no masses [Normal Gait] : normal gait [No Clubbing] : no clubbing [Normal Color/ Pigmentation] : normal color/ pigmentation [No Edema] : no edema [No Cyanosis] : no cyanosis [Normal Affect] : normal affect [No Focal Deficits] : no focal deficits [Oriented x3] : oriented x3

## 2020-02-05 NOTE — HISTORY OF PRESENT ILLNESS
[Never] : never [TextBox_4] : cough for 1 week after runny nose now with green mucus no temp, no wheeze having coughing fits\par Colonoscopy 2 yr ago\par Optho due\par Derm  needs to go\par using cpap nightly seeing uro  doing well with nocturia  saw [facility] with help\par some GERD ran out of omeprazole

## 2020-04-30 ENCOUNTER — RX RENEWAL (OUTPATIENT)
Age: 60
End: 2020-04-30

## 2020-07-01 ENCOUNTER — APPOINTMENT (OUTPATIENT)
Dept: PULMONOLOGY | Facility: CLINIC | Age: 60
End: 2020-07-01

## 2020-12-06 ENCOUNTER — RX RENEWAL (OUTPATIENT)
Age: 60
End: 2020-12-06

## 2020-12-16 PROBLEM — Z12.11 ENCOUNTER FOR SCREENING COLONOSCOPY: Status: RESOLVED | Noted: 2018-12-04 | Resolved: 2020-12-16

## 2020-12-21 PROBLEM — Z87.09 HISTORY OF PHARYNGITIS: Status: RESOLVED | Noted: 2019-06-25 | Resolved: 2020-12-21

## 2021-01-29 NOTE — ASSESSMENT
Persistent tachycardia throughout the examination heart rate noted to be between 100 and 120 [FreeTextEntry1] : r/t in 3 months f/u

## 2021-03-12 ENCOUNTER — LABORATORY RESULT (OUTPATIENT)
Age: 61
End: 2021-03-12

## 2021-03-12 ENCOUNTER — NON-APPOINTMENT (OUTPATIENT)
Age: 61
End: 2021-03-12

## 2021-03-12 ENCOUNTER — APPOINTMENT (OUTPATIENT)
Dept: PULMONOLOGY | Facility: CLINIC | Age: 61
End: 2021-03-12
Payer: COMMERCIAL

## 2021-03-12 VITALS
RESPIRATION RATE: 16 BRPM | OXYGEN SATURATION: 95 % | SYSTOLIC BLOOD PRESSURE: 118 MMHG | DIASTOLIC BLOOD PRESSURE: 80 MMHG | TEMPERATURE: 97.6 F | BODY MASS INDEX: 37.47 KG/M2 | WEIGHT: 253 LBS | HEIGHT: 69 IN | HEART RATE: 89 BPM

## 2021-03-12 DIAGNOSIS — R41.3 OTHER AMNESIA: ICD-10-CM

## 2021-03-12 PROCEDURE — 99072 ADDL SUPL MATRL&STAF TM PHE: CPT

## 2021-03-12 PROCEDURE — 99396 PREV VISIT EST AGE 40-64: CPT | Mod: 25

## 2021-03-12 PROCEDURE — 93000 ELECTROCARDIOGRAM COMPLETE: CPT

## 2021-03-12 PROCEDURE — 36415 COLL VENOUS BLD VENIPUNCTURE: CPT

## 2021-03-12 RX ORDER — AZITHROMYCIN 250 MG/1
250 TABLET, FILM COATED ORAL
Qty: 1 | Refills: 1 | Status: DISCONTINUED | COMMUNITY
Start: 2020-02-04 | End: 2021-03-12

## 2021-03-12 RX ORDER — OMEPRAZOLE 20 MG/1
20 CAPSULE, DELAYED RELEASE ORAL
Refills: 0 | Status: DISCONTINUED | COMMUNITY
End: 2021-03-12

## 2021-03-12 RX ORDER — OMEPRAZOLE 40 MG/1
40 CAPSULE, DELAYED RELEASE ORAL
Qty: 30 | Refills: 5 | Status: DISCONTINUED | COMMUNITY
Start: 2020-02-04 | End: 2021-03-12

## 2021-03-12 NOTE — HISTORY OF PRESENT ILLNESS
[TextBox_4] : Colonoscopy 1/2019\par Optho not recent\par Derm not recent\par \par Patient thinks last visit in feb maybe had COVID. Had a cough and used Wixela for a short time.\par Saw Dr Adams over 1 year ago. Always have leg swelling in right leg\par \par Still grieving from sons death\par \par using Cpap night never got supplies\par  using pillows but wants full face\par \par family feels he should see neuro for memory changes, they feel he  asked the same questions twice, he has misplaced kety\par and eye glasses and wallet recently

## 2021-03-12 NOTE — ASSESSMENT
[FreeTextEntry1] : Weight loss recommended and discussed.\par Labs drawn in office today\par Medications reviewed and renewed.\par Due optho and derm.\par Send in chip. For CONNER\par Neuro evaluation\par \par \par \par

## 2021-03-12 NOTE — DISCUSSION/SUMMARY
[FreeTextEntry1] : BP borderline, good today\par CONNER\par GERD\par depression improved\par Overweight\par memory changes

## 2021-03-12 NOTE — PHYSICAL EXAM
[No Acute Distress] : no acute distress [Normal Oropharynx] : normal oropharynx [Normal Appearance] : normal appearance [Supple] : supple [Thyroid Not Enlarged] : thyroid not enlarged [No Neck Mass] : no neck mass [No JVD] : no jvd [Normal S1, S2] : normal s1, s2 [No Murmurs] : no murmurs [Clear to Auscultation Bilaterally] : clear to auscultation bilaterally [Normal to Percussion] : normal to percussion [Benign] : benign [Not Tender] : not tender [No Masses] : no masses [No HSM] : no hsm [Normal Gait] : normal gait [No Clubbing] : no clubbing [No Cyanosis] : no cyanosis [No Edema] : no edema [Normal Color/ Pigmentation] : normal color/ pigmentation [No Focal Deficits] : no focal deficits [Oriented x3] : oriented x3 [Normal Affect] : normal affect [General Appearance - Well Developed] : well developed [General Appearance - Well Nourished] : well nourished [Jugular Venous Distention Increased] : there was no jugular-venous distention [Heart Sounds] : normal S1 and S2 [Edema] : no peripheral edema present [Auscultation Breath Sounds / Voice Sounds] : lungs were clear to auscultation bilaterally [Abdomen Soft] : soft [Abdomen Tenderness] : non-tender [] : no hepato-splenomegaly [Nail Clubbing] : no clubbing of the fingernails [Cyanosis, Localized] : no localized cyanosis

## 2021-03-17 LAB
25(OH)D3 SERPL-MCNC: 27 NG/ML
ALBUMIN SERPL ELPH-MCNC: 4.7 G/DL
ALP BLD-CCNC: 74 U/L
ALT SERPL-CCNC: 28 U/L
ANION GAP SERPL CALC-SCNC: 16 MMOL/L
APPEARANCE: CLEAR
AST SERPL-CCNC: 18 U/L
BASOPHILS # BLD AUTO: 0.04 K/UL
BASOPHILS NFR BLD AUTO: 0.7 %
BILIRUB DIRECT SERPL-MCNC: 0.1 MG/DL
BILIRUB INDIRECT SERPL-MCNC: 0.3 MG/DL
BILIRUB SERPL-MCNC: 0.4 MG/DL
BILIRUBIN URINE: NEGATIVE
BLOOD URINE: NEGATIVE
BUN SERPL-MCNC: 20 MG/DL
CALCIUM SERPL-MCNC: 9.8 MG/DL
CHLORIDE SERPL-SCNC: 104 MMOL/L
CHOLEST SERPL-MCNC: 198 MG/DL
CO2 SERPL-SCNC: 20 MMOL/L
COLOR: YELLOW
CREAT SERPL-MCNC: 1.12 MG/DL
EOSINOPHIL # BLD AUTO: 0.1 K/UL
EOSINOPHIL NFR BLD AUTO: 1.7 %
ESTIMATED AVERAGE GLUCOSE: 123 MG/DL
FOLATE SERPL-MCNC: 15.7 NG/ML
GLUCOSE QUALITATIVE U: NEGATIVE
GLUCOSE SERPL-MCNC: 94 MG/DL
HBA1C MFR BLD HPLC: 5.9 %
HCT VFR BLD CALC: 43.5 %
HDLC SERPL-MCNC: 39 MG/DL
HGB BLD-MCNC: 14 G/DL
IMM GRANULOCYTES NFR BLD AUTO: 0.3 %
KETONES URINE: NEGATIVE
LDLC SERPL CALC-MCNC: 127 MG/DL
LEUKOCYTE ESTERASE URINE: NEGATIVE
LYMPHOCYTES # BLD AUTO: 2.19 K/UL
LYMPHOCYTES NFR BLD AUTO: 36.6 %
MAN DIFF?: NORMAL
MCHC RBC-ENTMCNC: 30.1 PG
MCHC RBC-ENTMCNC: 32.2 GM/DL
MCV RBC AUTO: 93.5 FL
MONOCYTES # BLD AUTO: 0.46 K/UL
MONOCYTES NFR BLD AUTO: 7.7 %
NEUTROPHILS # BLD AUTO: 3.18 K/UL
NEUTROPHILS NFR BLD AUTO: 53 %
NITRITE URINE: NEGATIVE
NONHDLC SERPL-MCNC: 159 MG/DL
PH URINE: 5.5
PLATELET # BLD AUTO: 256 K/UL
POTASSIUM SERPL-SCNC: 4.4 MMOL/L
PROT SERPL-MCNC: 7.4 G/DL
PROTEIN URINE: NEGATIVE
PSA SERPL-MCNC: 1.64 NG/ML
RBC # BLD: 4.65 M/UL
RBC # FLD: 11.9 %
SODIUM SERPL-SCNC: 139 MMOL/L
SPECIFIC GRAVITY URINE: 1.02
T3 SERPL-MCNC: 112 NG/DL
T3RU NFR SERPL: 1 TBI
T4 FREE SERPL-MCNC: 1 NG/DL
T4 SERPL-MCNC: 5.5 UG/DL
TRIGL SERPL-MCNC: 158 MG/DL
TSH SERPL-ACNC: 1.12 UIU/ML
UROBILINOGEN URINE: NORMAL
VIT B12 SERPL-MCNC: 570 PG/ML
WBC # FLD AUTO: 5.99 K/UL

## 2021-06-03 ENCOUNTER — RX RENEWAL (OUTPATIENT)
Age: 61
End: 2021-06-03

## 2021-09-10 ENCOUNTER — APPOINTMENT (OUTPATIENT)
Dept: PULMONOLOGY | Facility: CLINIC | Age: 61
End: 2021-09-10

## 2021-12-09 ENCOUNTER — RX RENEWAL (OUTPATIENT)
Age: 61
End: 2021-12-09

## 2022-02-04 ENCOUNTER — APPOINTMENT (OUTPATIENT)
Dept: PULMONOLOGY | Facility: CLINIC | Age: 62
End: 2022-02-04
Payer: COMMERCIAL

## 2022-02-04 VITALS
TEMPERATURE: 98.3 F | HEART RATE: 86 BPM | OXYGEN SATURATION: 96 % | DIASTOLIC BLOOD PRESSURE: 83 MMHG | SYSTOLIC BLOOD PRESSURE: 121 MMHG

## 2022-02-04 DIAGNOSIS — S49.91XA UNSPECIFIED INJURY OF RIGHT SHOULDER AND UPPER ARM, INITIAL ENCOUNTER: ICD-10-CM

## 2022-02-04 PROCEDURE — 99214 OFFICE O/P EST MOD 30 MIN: CPT

## 2022-02-06 NOTE — DISCUSSION/SUMMARY
[FreeTextEntry1] : Preop for right shoulder rotator cuff surgery.\par CONNER by history compliant to CPAP\par Hyperlipidemia on therapy.\par Overweight.\par

## 2022-02-06 NOTE — ASSESSMENT
[FreeTextEntry1] : Obtain CPAP data.\par Continue present CPAP settings.\par \par 35 minutes spent in review examination and discussion.\par \par Obtain presurgical testing and subsequent clearance.\par \par \par Addendum: Labs and ekg reviewed.\par \par Medical problems as above. Medical status maximized. No medical contraindications to surgery.\par Pt with CONNER. Anesthesia should be aware of CONNER and take CONNER precautions.\par

## 2022-02-06 NOTE — HISTORY OF PRESENT ILLNESS
[TextBox_4] : Preop for rotator cuff surgery right shoulder on 7/10/22\par \par Feeling well.\par CONNER compliant to CPAP.\par \par Denies recent upper respiratory infections.\par Denies chest pain or shortness of breath.\par \par \par \par

## 2022-02-28 ENCOUNTER — EMERGENCY (EMERGENCY)
Facility: HOSPITAL | Age: 62
LOS: 1 days | Discharge: ANOTHER HOSPITAL | End: 2022-02-28
Attending: STUDENT IN AN ORGANIZED HEALTH CARE EDUCATION/TRAINING PROGRAM | Admitting: STUDENT IN AN ORGANIZED HEALTH CARE EDUCATION/TRAINING PROGRAM
Payer: COMMERCIAL

## 2022-02-28 ENCOUNTER — INPATIENT (INPATIENT)
Facility: HOSPITAL | Age: 62
LOS: 2 days | Discharge: HOME | DRG: 163 | End: 2022-03-03
Attending: HOSPITALIST | Admitting: HOSPITALIST
Payer: COMMERCIAL

## 2022-02-28 VITALS
SYSTOLIC BLOOD PRESSURE: 131 MMHG | TEMPERATURE: 99 F | HEART RATE: 134 BPM | RESPIRATION RATE: 20 BRPM | HEIGHT: 69 IN | WEIGHT: 244.93 LBS | OXYGEN SATURATION: 91 % | DIASTOLIC BLOOD PRESSURE: 90 MMHG

## 2022-02-28 VITALS
TEMPERATURE: 98 F | SYSTOLIC BLOOD PRESSURE: 104 MMHG | RESPIRATION RATE: 20 BRPM | HEIGHT: 69 IN | OXYGEN SATURATION: 93 % | HEART RATE: 122 BPM | DIASTOLIC BLOOD PRESSURE: 69 MMHG

## 2022-02-28 VITALS
DIASTOLIC BLOOD PRESSURE: 51 MMHG | RESPIRATION RATE: 16 BRPM | OXYGEN SATURATION: 96 % | SYSTOLIC BLOOD PRESSURE: 101 MMHG | HEART RATE: 126 BPM | TEMPERATURE: 98 F

## 2022-02-28 DIAGNOSIS — I26.99 OTHER PULMONARY EMBOLISM WITHOUT ACUTE COR PULMONALE: ICD-10-CM

## 2022-02-28 DIAGNOSIS — Z98.890 OTHER SPECIFIED POSTPROCEDURAL STATES: Chronic | ICD-10-CM

## 2022-02-28 LAB
ALBUMIN SERPL ELPH-MCNC: 3.9 G/DL — SIGNIFICANT CHANGE UP (ref 3.3–5)
ALP SERPL-CCNC: 120 U/L — SIGNIFICANT CHANGE UP (ref 40–120)
ALT FLD-CCNC: 28 U/L — SIGNIFICANT CHANGE UP (ref 12–78)
ANION GAP SERPL CALC-SCNC: 8 MMOL/L — SIGNIFICANT CHANGE UP (ref 5–17)
APTT BLD: 149.6 SEC — CRITICAL HIGH (ref 27.5–35.5)
APTT BLD: 32.2 SEC — SIGNIFICANT CHANGE UP (ref 27.5–35.5)
APTT BLD: 84.9 SEC — HIGH (ref 27.5–35.5)
AST SERPL-CCNC: 15 U/L — SIGNIFICANT CHANGE UP (ref 15–37)
BASOPHILS # BLD AUTO: 0.07 K/UL — SIGNIFICANT CHANGE UP (ref 0–0.2)
BASOPHILS # BLD AUTO: 0.07 K/UL — SIGNIFICANT CHANGE UP (ref 0–0.2)
BASOPHILS NFR BLD AUTO: 0.7 % — SIGNIFICANT CHANGE UP (ref 0–2)
BASOPHILS NFR BLD AUTO: 0.7 % — SIGNIFICANT CHANGE UP (ref 0–2)
BILIRUB SERPL-MCNC: 0.6 MG/DL — SIGNIFICANT CHANGE UP (ref 0.2–1.2)
BLD GP AB SCN SERPL QL: SIGNIFICANT CHANGE UP
BUN SERPL-MCNC: 17 MG/DL — SIGNIFICANT CHANGE UP (ref 7–23)
CALCIUM SERPL-MCNC: 9.4 MG/DL — SIGNIFICANT CHANGE UP (ref 8.5–10.1)
CHLORIDE SERPL-SCNC: 108 MMOL/L — SIGNIFICANT CHANGE UP (ref 96–108)
CO2 SERPL-SCNC: 22 MMOL/L — SIGNIFICANT CHANGE UP (ref 22–31)
CREAT SERPL-MCNC: 1.2 MG/DL — SIGNIFICANT CHANGE UP (ref 0.5–1.3)
D DIMER BLD IA.RAPID-MCNC: 2929 NG/ML DDU — HIGH
EGFR: 69 ML/MIN/1.73M2 — SIGNIFICANT CHANGE UP
EOSINOPHIL # BLD AUTO: 0.06 K/UL — SIGNIFICANT CHANGE UP (ref 0–0.5)
EOSINOPHIL # BLD AUTO: 0.11 K/UL — SIGNIFICANT CHANGE UP (ref 0–0.5)
EOSINOPHIL NFR BLD AUTO: 0.6 % — SIGNIFICANT CHANGE UP (ref 0–6)
EOSINOPHIL NFR BLD AUTO: 1.1 % — SIGNIFICANT CHANGE UP (ref 0–6)
FLUAV AG NPH QL: SIGNIFICANT CHANGE UP
FLUBV AG NPH QL: SIGNIFICANT CHANGE UP
GLUCOSE SERPL-MCNC: 163 MG/DL — HIGH (ref 70–99)
HCT VFR BLD CALC: 42.8 % — SIGNIFICANT CHANGE UP (ref 39–50)
HCT VFR BLD CALC: 43.6 % — SIGNIFICANT CHANGE UP (ref 39–50)
HGB BLD-MCNC: 14.4 G/DL — SIGNIFICANT CHANGE UP (ref 13–17)
HGB BLD-MCNC: 15.3 G/DL — SIGNIFICANT CHANGE UP (ref 13–17)
IMM GRANULOCYTES NFR BLD AUTO: 0.4 % — SIGNIFICANT CHANGE UP (ref 0–1.5)
IMM GRANULOCYTES NFR BLD AUTO: 0.4 % — SIGNIFICANT CHANGE UP (ref 0–1.5)
INR BLD: 1.11 RATIO — SIGNIFICANT CHANGE UP (ref 0.88–1.16)
INR BLD: 1.14 RATIO — SIGNIFICANT CHANGE UP (ref 0.88–1.16)
LACTATE BLDV-MCNC: 2.1 MMOL/L — HIGH (ref 0.5–2)
LYMPHOCYTES # BLD AUTO: 1.58 K/UL — SIGNIFICANT CHANGE UP (ref 1–3.3)
LYMPHOCYTES # BLD AUTO: 16.2 % — SIGNIFICANT CHANGE UP (ref 13–44)
LYMPHOCYTES # BLD AUTO: 2.13 K/UL — SIGNIFICANT CHANGE UP (ref 1–3.3)
LYMPHOCYTES # BLD AUTO: 20.3 % — SIGNIFICANT CHANGE UP (ref 13–44)
MCHC RBC-ENTMCNC: 29.9 PG — SIGNIFICANT CHANGE UP (ref 27–34)
MCHC RBC-ENTMCNC: 30.6 PG — SIGNIFICANT CHANGE UP (ref 27–34)
MCHC RBC-ENTMCNC: 33.6 GM/DL — SIGNIFICANT CHANGE UP (ref 32–36)
MCHC RBC-ENTMCNC: 35.1 GM/DL — SIGNIFICANT CHANGE UP (ref 32–36)
MCV RBC AUTO: 87.2 FL — SIGNIFICANT CHANGE UP (ref 80–100)
MCV RBC AUTO: 88.8 FL — SIGNIFICANT CHANGE UP (ref 80–100)
MONOCYTES # BLD AUTO: 0.75 K/UL — SIGNIFICANT CHANGE UP (ref 0–0.9)
MONOCYTES # BLD AUTO: 0.9 K/UL — SIGNIFICANT CHANGE UP (ref 0–0.9)
MONOCYTES NFR BLD AUTO: 7.7 % — SIGNIFICANT CHANGE UP (ref 2–14)
MONOCYTES NFR BLD AUTO: 8.6 % — SIGNIFICANT CHANGE UP (ref 2–14)
NEUTROPHILS # BLD AUTO: 7.22 K/UL — SIGNIFICANT CHANGE UP (ref 1.8–7.4)
NEUTROPHILS # BLD AUTO: 7.24 K/UL — SIGNIFICANT CHANGE UP (ref 1.8–7.4)
NEUTROPHILS NFR BLD AUTO: 68.9 % — SIGNIFICANT CHANGE UP (ref 43–77)
NEUTROPHILS NFR BLD AUTO: 74.4 % — SIGNIFICANT CHANGE UP (ref 43–77)
NRBC # BLD: 0 /100 WBCS — SIGNIFICANT CHANGE UP (ref 0–0)
NT-PROBNP SERPL-SCNC: 392 PG/ML — HIGH (ref 0–125)
PLATELET # BLD AUTO: 250 K/UL — SIGNIFICANT CHANGE UP (ref 150–400)
PLATELET # BLD AUTO: 287 K/UL — SIGNIFICANT CHANGE UP (ref 150–400)
POTASSIUM SERPL-MCNC: 4.2 MMOL/L — SIGNIFICANT CHANGE UP (ref 3.5–5.3)
POTASSIUM SERPL-SCNC: 4.2 MMOL/L — SIGNIFICANT CHANGE UP (ref 3.5–5.3)
PROT SERPL-MCNC: 7.7 G/DL — SIGNIFICANT CHANGE UP (ref 6–8.3)
PROTHROM AB SERPL-ACNC: 13 SEC — SIGNIFICANT CHANGE UP (ref 10.5–13.4)
PROTHROM AB SERPL-ACNC: 13.2 SEC — SIGNIFICANT CHANGE UP (ref 10.5–13.4)
RBC # BLD: 4.82 M/UL — SIGNIFICANT CHANGE UP (ref 4.2–5.8)
RBC # BLD: 5 M/UL — SIGNIFICANT CHANGE UP (ref 4.2–5.8)
RBC # FLD: 11.6 % — SIGNIFICANT CHANGE UP (ref 10.3–14.5)
RBC # FLD: 11.7 % — SIGNIFICANT CHANGE UP (ref 10.3–14.5)
RSV RNA NPH QL NAA+NON-PROBE: SIGNIFICANT CHANGE UP
SARS-COV-2 RNA SPEC QL NAA+PROBE: SIGNIFICANT CHANGE UP
SARS-COV-2 RNA SPEC QL NAA+PROBE: SIGNIFICANT CHANGE UP
SODIUM SERPL-SCNC: 138 MMOL/L — SIGNIFICANT CHANGE UP (ref 135–145)
TROPONIN I, HIGH SENSITIVITY RESULT: 1045.1 NG/L — HIGH
WBC # BLD: 10.47 K/UL — SIGNIFICANT CHANGE UP (ref 3.8–10.5)
WBC # BLD: 9.74 K/UL — SIGNIFICANT CHANGE UP (ref 3.8–10.5)
WBC # FLD AUTO: 10.47 K/UL — SIGNIFICANT CHANGE UP (ref 3.8–10.5)
WBC # FLD AUTO: 9.74 K/UL — SIGNIFICANT CHANGE UP (ref 3.8–10.5)

## 2022-02-28 PROCEDURE — 99285 EMERGENCY DEPT VISIT HI MDM: CPT | Mod: 25

## 2022-02-28 PROCEDURE — 99223 1ST HOSP IP/OBS HIGH 75: CPT

## 2022-02-28 PROCEDURE — 36415 COLL VENOUS BLD VENIPUNCTURE: CPT

## 2022-02-28 PROCEDURE — 99222 1ST HOSP IP/OBS MODERATE 55: CPT

## 2022-02-28 PROCEDURE — 96374 THER/PROPH/DIAG INJ IV PUSH: CPT | Mod: XU

## 2022-02-28 PROCEDURE — 93005 ELECTROCARDIOGRAM TRACING: CPT

## 2022-02-28 PROCEDURE — 85610 PROTHROMBIN TIME: CPT

## 2022-02-28 PROCEDURE — 71275 CT ANGIOGRAPHY CHEST: CPT | Mod: MA

## 2022-02-28 PROCEDURE — 85025 COMPLETE CBC W/AUTO DIFF WBC: CPT

## 2022-02-28 PROCEDURE — 99285 EMERGENCY DEPT VISIT HI MDM: CPT

## 2022-02-28 PROCEDURE — 71275 CT ANGIOGRAPHY CHEST: CPT | Mod: 26,MA

## 2022-02-28 PROCEDURE — 83880 ASSAY OF NATRIURETIC PEPTIDE: CPT

## 2022-02-28 PROCEDURE — 85379 FIBRIN DEGRADATION QUANT: CPT

## 2022-02-28 PROCEDURE — 85730 THROMBOPLASTIN TIME PARTIAL: CPT

## 2022-02-28 PROCEDURE — 80053 COMPREHEN METABOLIC PANEL: CPT

## 2022-02-28 PROCEDURE — 93010 ELECTROCARDIOGRAM REPORT: CPT

## 2022-02-28 PROCEDURE — 84484 ASSAY OF TROPONIN QUANT: CPT

## 2022-02-28 PROCEDURE — 71045 X-RAY EXAM CHEST 1 VIEW: CPT

## 2022-02-28 PROCEDURE — 87635 SARS-COV-2 COVID-19 AMP PRB: CPT

## 2022-02-28 PROCEDURE — 71045 X-RAY EXAM CHEST 1 VIEW: CPT | Mod: 26

## 2022-02-28 PROCEDURE — 99291 CRITICAL CARE FIRST HOUR: CPT

## 2022-02-28 RX ORDER — HEPARIN SODIUM 5000 [USP'U]/ML
9000 INJECTION INTRAVENOUS; SUBCUTANEOUS EVERY 6 HOURS
Refills: 0 | Status: DISCONTINUED | OUTPATIENT
Start: 2022-02-28 | End: 2022-03-01

## 2022-02-28 RX ORDER — HEPARIN SODIUM 5000 [USP'U]/ML
4500 INJECTION INTRAVENOUS; SUBCUTANEOUS EVERY 6 HOURS
Refills: 0 | Status: DISCONTINUED | OUTPATIENT
Start: 2022-02-28 | End: 2022-03-01

## 2022-02-28 RX ORDER — SIMVASTATIN 20 MG/1
1 TABLET, FILM COATED ORAL
Qty: 0 | Refills: 0 | DISCHARGE

## 2022-02-28 RX ORDER — ATORVASTATIN CALCIUM 80 MG/1
20 TABLET, FILM COATED ORAL AT BEDTIME
Refills: 0 | Status: DISCONTINUED | OUTPATIENT
Start: 2022-02-28 | End: 2022-03-03

## 2022-02-28 RX ORDER — SERTRALINE 25 MG/1
50 TABLET, FILM COATED ORAL DAILY
Refills: 0 | Status: DISCONTINUED | OUTPATIENT
Start: 2022-02-28 | End: 2022-03-03

## 2022-02-28 RX ORDER — HEPARIN SODIUM 5000 [USP'U]/ML
4500 INJECTION INTRAVENOUS; SUBCUTANEOUS EVERY 6 HOURS
Refills: 0 | Status: DISCONTINUED | OUTPATIENT
Start: 2022-02-28 | End: 2022-03-03

## 2022-02-28 RX ORDER — HEPARIN SODIUM 5000 [USP'U]/ML
INJECTION INTRAVENOUS; SUBCUTANEOUS
Qty: 25000 | Refills: 0 | Status: DISCONTINUED | OUTPATIENT
Start: 2022-02-28 | End: 2022-02-28

## 2022-02-28 RX ORDER — HEPARIN SODIUM 5000 [USP'U]/ML
1600 INJECTION INTRAVENOUS; SUBCUTANEOUS
Qty: 25000 | Refills: 0 | Status: DISCONTINUED | OUTPATIENT
Start: 2022-02-28 | End: 2022-03-01

## 2022-02-28 RX ORDER — OXYCODONE AND ACETAMINOPHEN 5; 325 MG/1; MG/1
1 TABLET ORAL EVERY 6 HOURS
Refills: 0 | Status: DISCONTINUED | OUTPATIENT
Start: 2022-02-28 | End: 2022-03-02

## 2022-02-28 RX ORDER — HEPARIN SODIUM 5000 [USP'U]/ML
9000 INJECTION INTRAVENOUS; SUBCUTANEOUS EVERY 6 HOURS
Refills: 0 | Status: DISCONTINUED | OUTPATIENT
Start: 2022-02-28 | End: 2022-03-03

## 2022-02-28 RX ORDER — HEPARIN SODIUM 5000 [USP'U]/ML
INJECTION INTRAVENOUS; SUBCUTANEOUS
Qty: 25000 | Refills: 0 | Status: DISCONTINUED | OUTPATIENT
Start: 2022-02-28 | End: 2022-03-03

## 2022-02-28 RX ORDER — ACETAMINOPHEN 500 MG
650 TABLET ORAL EVERY 6 HOURS
Refills: 0 | Status: DISCONTINUED | OUTPATIENT
Start: 2022-02-28 | End: 2022-03-03

## 2022-02-28 RX ORDER — HEPARIN SODIUM 5000 [USP'U]/ML
1600 INJECTION INTRAVENOUS; SUBCUTANEOUS
Qty: 25000 | Refills: 0 | Status: DISCONTINUED | OUTPATIENT
Start: 2022-02-28 | End: 2022-02-28

## 2022-02-28 RX ORDER — SERTRALINE 25 MG/1
1 TABLET, FILM COATED ORAL
Qty: 0 | Refills: 0 | DISCHARGE

## 2022-02-28 RX ORDER — ONDANSETRON 8 MG/1
4 TABLET, FILM COATED ORAL EVERY 8 HOURS
Refills: 0 | Status: DISCONTINUED | OUTPATIENT
Start: 2022-02-28 | End: 2022-03-01

## 2022-02-28 RX ORDER — SODIUM CHLORIDE 9 MG/ML
1000 INJECTION, SOLUTION INTRAVENOUS
Refills: 0 | Status: DISCONTINUED | OUTPATIENT
Start: 2022-02-28 | End: 2022-03-01

## 2022-02-28 RX ORDER — SODIUM CHLORIDE 9 MG/ML
1000 INJECTION INTRAMUSCULAR; INTRAVENOUS; SUBCUTANEOUS ONCE
Refills: 0 | Status: COMPLETED | OUTPATIENT
Start: 2022-02-28 | End: 2022-02-28

## 2022-02-28 RX ORDER — HEPARIN SODIUM 5000 [USP'U]/ML
9000 INJECTION INTRAVENOUS; SUBCUTANEOUS ONCE
Refills: 0 | Status: COMPLETED | OUTPATIENT
Start: 2022-02-28 | End: 2022-02-28

## 2022-02-28 RX ORDER — ATORVASTATIN CALCIUM 80 MG/1
0 TABLET, FILM COATED ORAL
Qty: 0 | Refills: 0 | DISCHARGE

## 2022-02-28 RX ORDER — PANTOPRAZOLE SODIUM 20 MG/1
40 TABLET, DELAYED RELEASE ORAL
Refills: 0 | Status: DISCONTINUED | OUTPATIENT
Start: 2022-02-28 | End: 2022-03-03

## 2022-02-28 RX ADMIN — SODIUM CHLORIDE 1000 MILLILITER(S): 9 INJECTION INTRAMUSCULAR; INTRAVENOUS; SUBCUTANEOUS at 12:23

## 2022-02-28 RX ADMIN — HEPARIN SODIUM 2000 UNIT(S)/HR: 5000 INJECTION INTRAVENOUS; SUBCUTANEOUS at 18:57

## 2022-02-28 RX ADMIN — HEPARIN SODIUM 1600 UNIT(S)/HR: 5000 INJECTION INTRAVENOUS; SUBCUTANEOUS at 20:05

## 2022-02-28 RX ADMIN — HEPARIN SODIUM 2000 UNIT(S)/HR: 5000 INJECTION INTRAVENOUS; SUBCUTANEOUS at 12:21

## 2022-02-28 RX ADMIN — SODIUM CHLORIDE 75 MILLILITER(S): 9 INJECTION, SOLUTION INTRAVENOUS at 22:52

## 2022-02-28 RX ADMIN — HEPARIN SODIUM 9000 UNIT(S): 5000 INJECTION INTRAVENOUS; SUBCUTANEOUS at 12:20

## 2022-02-28 RX ADMIN — ATORVASTATIN CALCIUM 20 MILLIGRAM(S): 80 TABLET, FILM COATED ORAL at 22:49

## 2022-02-28 RX ADMIN — HEPARIN SODIUM 2000 UNIT(S)/HR: 5000 INJECTION INTRAVENOUS; SUBCUTANEOUS at 15:02

## 2022-02-28 NOTE — ED PROVIDER NOTE - ATTENDING CONTRIBUTION TO CARE
I, Enzo Ponce, performed a face to face bedside interview with this patient regarding history of present illness, review of symptoms and relevant past medical, social and family history.  I completed an independent physical examination. I have communicated the patient’s plan of care and disposition with the resident.  61 year old male with pMH DVT, not currently on AC, HTN, HLD presents as transfer from [place] for PE. Pt had rotator cuff surgery and has been sedentary since then, and has had 3 days of chest pain and SOB, went to [facility] and found to have b/l PE with signs of R heart strain  Gen: NAD, well appearing  CV: RRR  Pul: CTA b/l  Abd: Soft, non-distended, non-tender  Neuro: no focal deficits  Pt to go for mechanical thrombectomy tomorrow, hemodynamically stable at this time, declined by ICU

## 2022-02-28 NOTE — ED PROVIDER NOTE - PHYSICAL EXAMINATION
General: NAD, well appearing  HEENT: Normocephalic, atraumatic  Neck: No apparent stiffness or JVD  Pulm: Chest wall symmetric and nontender, lungs clear to ascultation   Cardiac: Regular rate and regular rhythm  Abdomen: Nontender and nondistended  Skin: Skin is warm, dry and intact without rashes or lesions.  Neuro: No motor or sensory deficits above reported baseline  MSK: No deformity or tenderness above reported baseline, RUE in cast

## 2022-02-28 NOTE — H&P ADULT - NSICDXFAMILYHX_GEN_ALL_CORE_FT
FAMILY HISTORY:  Sibling  Still living? Unknown  Family history of DVT, Age at diagnosis: Age Unknown

## 2022-02-28 NOTE — ED PROVIDER NOTE - NSICDXPASTMEDICALHX_GEN_ALL_CORE_FT
PAST MEDICAL HISTORY:  Depressed     DVT (deep venous thrombosis)     GERD (gastroesophageal reflux disease)     Hypercholesteremia

## 2022-02-28 NOTE — CONSULT NOTE ADULT - ASSESSMENT
Assessment and Plan:  62 y/o M with a PMHx of DVT (Dx 5 years ago, finished course of AC), HLD, GERD, and right rotator cuff surgery 2 weeks ago who presented to [facility] with new left calf swelling and shortness of breath. Patient states that after his surgery he has been taking recurrent oxycodone and sleeping a lot in his recliner. Patient states that starting on Friday he noted that his left leg was becoming swollen, and earlier last week his wife thought he looked a little winded. Patient states that then starting last night he began experiencing severe shortness of breath and diaphoresis which rendered him unable to sleep overnight. Patient presented to [place] where he was found to have an elevated D-Dimer, and CTA showed extensive bilateral pulmonary embolisms with evidence of RV strain. Patient also with elevated troponin and BNP, and he was transferred to [facility] for further evaluation. Patient states that he had a PE previously a few years ago after he hit his leg, and then flew to [place]. Patient states he was on AC short term, but never had a hypercoagulable workup. Patient denies any fevers, chills, CP, syncope, near syncope, abdominal pain, N/V/D, headache, or dizziness.     Troponin I: 1045.1  ProBNP: 392  Transthoracic: [RV function and PASP]: Severely enlarged RV with severely reduced RV systolic function. PASP 37 mmHg  US duplex: Pending  sPESI score: 1, High risk, 8.9% risk of death.    
 1: Acute hypoxic respiratory failure   2: Bilateral extensive pulmonary embolism with RV strain   3: Recent right rotator cuff tear s/p repair   4: Obesity   5: History of DVT with short course of anticoagulation with questionable venous stripping   6: Dyslipidemia   7: GERD     patient seen and examined.  Recommend admitting to telemetry with  while continuing on heparin infusion and obtain stat echocardiogram, trend troponin, lactic acid, BNP.  Plan discussed with interventional cardiology/PERT, good candidate for suction thrombectomy which he is planned for tomorrow morning as for now with recent surgical history relatively high risk with even half dose TPA.  Would consider getting bilateral lower extremity Doppler ultrasound for DVT evaluation as well as eventual hypercoagulable work-up and perhaps long-term anticoagulation with history of recurrent DVTs.  Plan of care discussed with patient and his wife at bedside.  All questions answered.

## 2022-02-28 NOTE — ED ADULT TRIAGE NOTE - CHIEF COMPLAINT QUOTE
pt transferred from [place] for bilat PE's with right heart strain and left DVT, pt states that he had shoulder surgery 2/10 and on Friday pt started to have left leg pain and today started to chest pain and JON, pt has heprain gtt infusing at 20

## 2022-02-28 NOTE — ED ADULT NURSE NOTE - NSIMPLEMENTINTERV_GEN_ALL_ED
Implemented All Fall with Harm Risk Interventions:  [place] to call system. Call bell, personal items and telephone within reach. Instruct patient to call for assistance. Room bathroom lighting operational. Non-slip footwear when patient is off stretcher. Physically safe environment: no spills, clutter or unnecessary equipment. Stretcher in lowest position, wheels locked, appropriate side rails in place. Provide visual cue, wrist band, yellow gown, etc. Monitor gait and stability. Monitor for mental status changes and reorient to person, place, and time. Review medications for side effects contributing to fall risk. Reinforce activity limits and safety measures with patient and family. Provide visual clues: red socks.

## 2022-02-28 NOTE — H&P ADULT - ASSESSMENT
60 yo male with h/o provoked DVT and PE 5 years back , currently not on AC, recent rotator cuff surgery 2 weeks back presented to the hospital with SOB and calf swelling.    Impression:    Acute Hypoxemic respiratory failure  due to Acute PE  currently on 4 ltr NC  wean as tolerated  stepdown monitoring with     High risk PE  b/l pulmonary embolism with right heart strain, positive troponin, elevated BNP  Echo severely dilated Rv and severe RV dysfunction  tPA contraindicated due to recent surgery  [place] team informed   planned for suction thrombectomy in am  keep NPO  iv maintenance fluid hydration  sPESI score: 3, High risk, 8.9% --high risk of death.    heparin drip monitor PTT target 60-90    HLD/GERD/anxiety  continu ehome meds    recent surgery:  ambulate as tolerated  outpatient orthopedic follow up

## 2022-02-28 NOTE — ED PROVIDER NOTE - NS ED ROS FT
Constitutional: No reported recent fever.  Neurological: No reported acute headache.  Eyes: No reported new vision changes.   Ears, Nose, Mouth, Throat: No reported acute sore throat.  Cardiovascular: +chest pain.  Respiratory: +shortness of breath.  Gastrointestinal: No reported vomiting.  Genitourinary: No reported new urinary problems.  Musculoskeletal: + left lower extremity swelling and pain.  Integumentary (skin and/or breast): No reported new rash.

## 2022-02-28 NOTE — ED ADULT NURSE NOTE - OBJECTIVE STATEMENT
Pt a/ox4, no signs of acute distress, LLE swollen with non pitting edema. Pt reports increase pain at calf with palpation and ambulation.  Pt also present with orthoscopic sites for right shoulder SX completed 2 weeks ago. Tachycardic on CM with no chest pain or SOB.

## 2022-02-28 NOTE — ED PROVIDER NOTE - PROGRESS NOTE DETAILS
found to have bilateral PE with heart strain, elevated troponin. [facility] team contacted, patient accepted. No thrombolysis now, will start heparin drip.

## 2022-02-28 NOTE — ED PROVIDER NOTE - PHYSICAL EXAMINATION
Vital signs as available reviewed.  General:  No acute distress.  Head:  Normocephalic, atraumatic.  Eyes:  Conjunctiva pink, no icterus.  Cardiovascular:  + tachycardia.  Respiratory:  Clear to auscultation, good air entry bilaterally.  Abdomen:  Soft, non-tender.  Musculoskeletal:  + LLE swelling and tenderness to palpation.  Neurologic: Alert and oriented, moving all extremities.  Skin:  Warm and dry.

## 2022-02-28 NOTE — ED ADULT NURSE NOTE - CHIEF COMPLAINT QUOTE
pt transferred from [place] for bilat PE's with right heart strain and left DVT, pt states that he had shoulder surgery 2/10 and on Friday pt started to have left leg pain and today started to chest pain and JON, pt has heprain gtt infusing at 20

## 2022-02-28 NOTE — H&P ADULT - NSHPPHYSICALEXAM_GEN_ALL_CORE
Vital Signs Last 24 Hrs  T(C): 36.7 (28 Feb 2022 20:01), Max: 37.1 (28 Feb 2022 10:08)  T(F): 98.1 (28 Feb 2022 20:01), Max: 98.8 (28 Feb 2022 10:08)  HR: 116 (28 Feb 2022 20:01) (101 - 134)  BP: 104/77 (28 Feb 2022 20:01) (101/51 - 165/85)  BP(mean): --  RR: 20 (28 Feb 2022 20:01) (16 - 22)  SpO2: 96% (28 Feb 2022 20:01) (91% - 96%)    PHYSICAL EXAM:  GENERAL: NAD, well-developed  HEAD:  Atraumatic, Normocephalic  EYES: EOMI, PERRLA, conjunctiva and sclera clear  NECK: Supple, No JVD  CHEST/LUNG: Clear to auscultation bilaterally; No wheeze  HEART: Regular rate and rhythm; No murmurs, rubs, or gallops  ABDOMEN: Soft, Nontender, Nondistended; Bowel sounds present  EXTREMITIES: bilateral lower extremity tenderness without any obvious swelling  PSYCH: AAOx3  NEUROLOGY: non-focal  SKIN: No rashes or lesions

## 2022-02-28 NOTE — CONSULT NOTE ADULT - SUBJECTIVE AND OBJECTIVE BOX
[facility] - Cardiology Team Note                                                                             [facility] Practice                                                                     Office:  [address] [facility] [facility]                                                                                  Telephone: [phone]. Fax:[phone]                                                                                 [facility] CARDIOLOGY CONSULTATION NOTE                                                                                             Consult requested by:  Dr. Ponce  Reason for Consultation: Intermediate-High Risk Pulmonary Embolism  History obtained by: Patient and medical record   obtained: No  Hospital Transferred from: [facility]  On call accepting attending (if transferred): Dr. Bautista    CC:  "I'm short of breath."    HPI: 62 y/o M with a PMHx of DVT (Dx 5 years ago, finished course of AC), HLD, GERD, and right rotator cuff surgery 2 weeks ago who presented to [facility] with new left calf swelling and shortness of breath. Patient states that after his surgery he has been taking recurrent oxycodone and sleeping a lot in his recliner. Patient states that starting on Friday he noted that his left leg was becoming swollen, and earlier last week his wife thought he looked a little winded. Patient states that then starting last night he began experiencing severe shortness of breath and diaphoresis which rendered him unable to sleep overnight. Patient presented to [facility] where he was found to have an elevated D-Dimer, and CTA showed extensive bilateral pulmonary embolisms with evidence of RV strain. Patient also with elevated troponin and BNP, and he was transferred to [facility] for further evaluation. Patient states that he had a PE previously a few years ago after he hit his leg, and then flew to [place]. Patient states he was on AC short term, but never had a hypercoagulable workup. Patient denies any fevers, chills, CP, syncope, near syncope, abdominal pain, N/V/D, headache, or dizziness.       Risk Factors:  Family Hx of Thrombophilia: Sister had a DVT, no diagnosis of hypercoagulable state.   Recent long haul travel: Denies  Immobility: Yes immobile after rotator cuff surgery   Prior hx of VTE: Yes  Cancer screening: Colonoscopy [UTD] Mammogram [] Papsmear [] PSA [UTD]      ALLERGIES: Allergies    No Known Allergies    Intolerances        PAST MEDICAL HISTORY  Hypercholesteremia    GERD (gastroesophageal reflux disease)    Depressed    DVT (deep venous thrombosis)        PAST SURGICAL HISTORY  No significant past surgical history        FAMILY HISTORY:  Sister- Hx of DVT      SOCIAL HISTORY:  Denies smoking/alcohol/drugs    CURRENT MEDICATIONS:     heparin  Infusion.        HOME MEDICATIONS:  Home Medications:  omeprazole 20 mg oral delayed release capsule: 1 cap(s) orally once a day, As Needed (01 Jun 2017 09:04)  sertraline 50 mg oral tablet: 1 tab(s) orally once a day (01 Jun 2017 09:04)  simvastatin 20 mg oral tablet: 1 tab(s) orally once a day (01 Jun 2017 09:04)    Vital Signs Last 24 Hrs  T(C): 36.5 (28 Feb 2022 13:03), Max: 37.1 (28 Feb 2022 10:08)  T(F): 97.7 (28 Feb 2022 13:03), Max: 98.8 (28 Feb 2022 10:08)  HR: 122 (28 Feb 2022 13:03) (122 - 134)  BP: 104/69 (28 Feb 2022 13:03) (101/51 - 131/90)  RR: 20 (28 Feb 2022 13:03) (16 - 20)  SpO2: 93% (28 Feb 2022 13:03) (91% - 96%)      Review of Systems    [ x] All others negative	  [ ] Unable to obtain    CONSTITUTIONAL: No fever, weight loss, or fatigue  EYES: No eye pain, visual disturbances, or discharge  ENT:  No difficulty hearing, tinnitus, vertigo; No sinus or throat pain  NECK: No pain or stiffness  RESPIRATORY:  AS PER HPI  CARDIOVASCULAR:  AS PER HPI  GASTROINTESTINAL: No abdominal or epigastric pain. No nausea, vomiting, or hematemesis; No diarrhea or constipation. No melena or hematochezia.  GENITOURINARY: No dysuria, frequency, hematuria, or incontinence  NEUROLOGICAL: No headaches, memory loss, loss of strength, numbness, or tremors  SKIN: AS PER HPI  LYMPH Nodes: No enlarged glands  ENDOCRINE: No heat or cold intolerance; No hair loss  MUSCULOSKELETAL: No joint pain or swelling; No muscle, back, or extremity pain  PSYCHIATRIC: No depression, anxiety, mood swings, or difficulty sleeping  HEME/LYMPH: No easy bruising, or bleeding gums  ALLERGY AND IMMUNOLOGIC: No hives or eczema    PHYSICAL EXAM:  Vital Signs Last 24 Hrs  T(C): 36.5 (28 Feb 2022 13:03), Max: 37.1 (28 Feb 2022 10:08)  T(F): 97.7 (28 Feb 2022 13:03), Max: 98.8 (28 Feb 2022 10:08)  HR: 122 (28 Feb 2022 13:03) (122 - 134)  BP: 104/69 (28 Feb 2022 13:03) (101/51 - 131/90)  RR: 20 (28 Feb 2022 13:03) (16 - 20)  SpO2: 93% (28 Feb 2022 13:03) (91% - 96%)    Constitutional: Comfortable . No acute distress.   HEENT: Atraumatic and normocephalic , neck is supple . no JVD. No carotid bruit. PEERL   CNS: A&Ox3. No focal deficits. EOMI. Cranial nerves II-IX are intact.   Lymph Nodes: Cervical : Not palpable.  Respiratory: CTAB  Cardiovascular: S1S2 Tachycardic. No murmur/rubs or gallop.  Gastrointestinal: Soft non-tender and non distended . +Bowel sounds. negative Bolanos's sign.  Extremities: Trace LLE edema. RUE in brace  Psychiatric: Calm . no agitation.  Skin: No skin rash/ulcers visualized to face, hands or feet.    Vascular Pulse Exam:  Right DP: [x]palpable []non-palpable []audible      Left DP :   [x]palpable []non-palpable []audible  Right PT: []xpalpable [] non-palpable []audible   Left PT:  [x] palpable [] non-palpable []audible         Intake and output:     LABS:                        14.4   10.47 )-----------( 287      ( 28 Feb 2022 13:48 )             42.8     02-28    138  |  108  |  17  ----------------------------<  163<H>  4.2   |  22  |  1.20    Ca    9.4      28 Feb 2022 10:40    TPro  7.7  /  Alb  3.9  /  TBili  0.6  /  DBili  x   /  AST  15  /  ALT  28  /  AlkPhos  120  02-28      ;p-BNP=Serum Pro-Brain Natriuretic Peptide: 392 pg/mL (02-28 @ 10:40)    PT/INR - ( 28 Feb 2022 13:48 )   PT: 13.2 sec;   INR: 1.14 ratio         PTT - ( 28 Feb 2022 13:48 )  PTT:149.6 sec      INTERPRETATION OF TELEMETRY: Reviewed by me. ST  ECG: Reviewed by me. ST, PVC    PREVIOUS DIAGNOSTIC TESTING  ECHO FINDINGS:  < from: TTE Echo Complete w/o Contrast w/ Doppler (02.28.22 @ 13:12) >  PHYSICIAN INTERPRETATION:  Left Ventricle: The left ventricular internal cavity size is normal. Left   ventricular wall thickness is mildly increased.  Global LV systolic function was normal. Left ventricular ejection   fraction, by visual estimation, is 60 to 65%. The interventricular septum   is flattened in systole and diastole, consistent with right ventricular   pressure and volume overload. The left ventricular diastolic function   could not be assessed in this study.  Right Ventricle: The right ventricular size is severely enlarged. RV   systolic function is severely reduced. Positive McConnells sign is   visualized.  Left Atrium: The left atrium is normal in size.  Right Atrium: The right atrium is normal in size.  Pericardium: There is no evidence of pericardial effusion.  Mitral Valve: Structurally normal mitral valve, with normal leaflet   excursion. Trace mitral valve regurgitation is seen.  Tricuspid Valve: Structurally normal tricuspid valve, with normal leaflet   excursion. Mild tricuspid regurgitation is visualized.  Aortic Valve: The aortic valve is trileaflet. Sclerotic aortic valve with   normal opening. No evidence of aortic valve regurgitation is seen.  Pulmonic Valve: The pulmonic valve was not well visualized. Trace   pulmonic valve regurgitation.  Aorta: The aortic root is normal in size and structure.  Pulmonary Artery: The main pulmonary artery is normal in size.  Venous: The inferior vena cava was dilated, with respiratory size   variation less than 50%.  In comparison to the previous echocardiogram(s): There are no prior   studies on this patient for comparison purposes.      Summary:   1. Technically difficult study.   2. Left ventricular ejection fraction, by visual estimation, is 60 to   65%.   3. Normal global left ventricular systolic function.   4. Mildly increased LV wall thickness.   5. The left ventricular diastolic function could not be assessed in this   study.   6. Septal flattening due to right ventricular volume and pressure   overload.   7. Severely enlarged right ventricle with severely reduced RV systolic   function (TAPSE 1.2 cm), estimated PASP 37 mmHg.   8. Trace mitral valve regurgitation.   9. Sclerotic aortic valve with normal opening.  10. Mild tricuspid regurgitation.  11. There is no evidence of pericardial effusion.  12. Right heart strain present.    Benjamín RichelleDO Electronically signed on 2/28/2022 at 2:06:30 PM    < end of copied text >      US DUPLEX: Pending      CTA OF THE CHEST:   < from: CT Angio Chest PE Protocol w/ IV Cont (02.28.22 @ 11:47) >  FINDINGS:    Pulmonary Artery:  There are pulmonary emboli within the distal right   main, right upper, middle, and lower lobar arteries and the segmental   arteriesof the right upper, middle, and lower lobes.    There are pulmonary emboli in the distal left main pulmonary artery and   the left upper, lingular, and lower lobar arteries and the segmental   arteries of the left upper and lower lobes.    Tubes/Lines: None.    Lungs, airways and pleura: The lungs are clear. Included airways are   normal. The pleura is normal.    Mediastinum: The visualized thyroid gland is normal. There are no   enlarged chest lymph nodes. The right atrium right ventricle are   enlarged. No pericardial effusion. Coronary calcifications.    The aorta is normal in caliber. The great vessels are normal along their   imaged segments.    Upper Abdomen: The upper abdomen is normal.    Bones And Soft Tissues: The bones are unremarkable.  The soft tissues are   unremarkable.      IMPRESSION:    1.  Bilateral pulmonary emboli.  2.  The findings are suspicious for right heart strain.  3.  The findings were discussed with read back verification obtained from   Dr. Garces and 2/28/2022 at 1153 hours.    < end of copied text >      RADIOLOGY & ADDITIONAL STUDIES:    X-ray:  reviewed by me.

## 2022-02-28 NOTE — ED ADULT NURSE REASSESSMENT NOTE - NS ED NURSE REASSESS COMMENT FT1
report given to rn miki at 19:45. rr even and unlabored. pt placed on tele box and . pt moved to cdu 15R. rn made aware of transfer.

## 2022-02-28 NOTE — ED ADULT NURSE REASSESSMENT NOTE - NS ED NURSE REASSESS COMMENT FT1
lab notified rn about aptt. md made aware. heparin paused for an hour until 16:00. will restart heparin at -4 less. pt continues to be on 3 l NC and cardiac monitoring in place.

## 2022-02-28 NOTE — ED ADULT NURSE NOTE - OBJECTIVE STATEMENT
assumed care of pt at 13:45. pt was a transfer from [place] for  bilat PE's with right heart strain and left DVT, pt states that he had shoulder surgery 2/10. pt c/o of chest pressure and left lower extremity pain since the morning. sob present at rest. pt sating 95% on RA. pt reports numbness in left lower extremity, +pedal pulses present.  heprain gtt infusing at 20. weight confirmed 111.4 kg. placed on cardiac monitor, noted to be tachycardic. anox4. airway patent. pt educated on plan of care, pt able to successfully teach back plan of care to RN, RN will continue to reeducate pt during hospital stay.

## 2022-02-28 NOTE — ED PROVIDER NOTE - OBJECTIVE STATEMENT
61M hx DVT not on AC for years, HLD, HLD, s/p right rotator cuff surgery 2 weeks ago, transferred from [place] ED for Bilateral pulmonary emboli, suspicious for right heart strain.  Patient arrives A&O with heparin drip. Currently denies symptoms;  denies pain, bleeding, SOB, chest pain, abdominal pain or fevers.  Denies other pertinent medical problems.  Denies any substance use.

## 2022-02-28 NOTE — ED PROVIDER NOTE - OBJECTIVE STATEMENT
61 M history of DVT, HLD, right rotator cuff surgery 2 weeks ago here complaining of left lower extremity swelling, chest pressure, shortness of breath. chest pain described as pressure, worse with deep breath. shortness of breath noted with exertion. LLE pain and swelling ongoing for a few days- patient thought it was a strain. no meds taken today. no associated fevers, chills, cough, nausea, vomiting.    Ortho: Jill PMD: Bruce

## 2022-02-28 NOTE — CONSULT NOTE ADULT - SUBJECTIVE AND OBJECTIVE BOX
Patient is a 61y old  Male who presents with a chief complaint of     HPI/BRIEF HOSPITAL COURSE:    61-year-old  male with history of essential hypertension, GERD, trauma/Travel induced DVT with short course anticoagulation with recent right rotator cuff surgery/repair after incurring trauma presented with left leg swelling and shortness of breath over the last 3 days which worsened now associated with excessive sweating and went to [facility] where he was found to have extensive bilateral PE with RV strain and subsequently sent to [facility] for further evaluation.    PAST MEDICAL & SURGICAL HISTORY:  Hypercholesteremia    GERD (gastroesophageal reflux disease)    Depressed    DVT (deep venous thrombosis)     remote left jaw plate placement for injury  ? Right lower extremity venous stripping   recent rotator cuff repair on the right side   right fourth and fifth digit/ toe injury with history of repair       allergies: No known drug allergy  Family history: Sister has history of DVT which according to him was provoked after trauma  Social History:   lives with his wife, works as an , no tobacco abuse, used to have alcohol use disorder but has not had any alcohol for over 6 months, lost his son from opioid overdose 3 years ago, denied any recreational substance abuse      Review of Systems:  All systems reviewed with symptoms mentions as above.     Physical Examination:    ICU Vital Signs Last 24 Hrs  T(C): 36.5 (28 Feb 2022 13:03), Max: 37.1 (28 Feb 2022 10:08)  T(F): 97.7 (28 Feb 2022 13:03), Max: 98.8 (28 Feb 2022 10:08)  HR: 108 (28 Feb 2022 16:28) (101 - 134)  BP: 129/79 (28 Feb 2022 16:28) (101/51 - 141/89)  BP(mean): --  ABP: --  ABP(mean): --  RR: 22 (28 Feb 2022 16:28) (16 - 22)  SpO2: 95% (28 Feb 2022 16:28) (91% - 96%)      General: No acute distress.   talking in full sentences while lying in bed with no apparent distress    Neuro: AAO*3, No motor, sensory, or cranial nerve deficit, supplemental oxygen with nasal cannula    HEENT: Pupils equal, reactive to light, Oral mucosa    PULM: Clear to auscultation bilaterally, no significant adventitious breath sounds     CVS: Regular rhythm and  increased rate, no murmurs, rubs, or gallops    ABD: Soft, nondistended, nontender, normoactive bowel sounds, no CVA tenderness    EXT: No b/l LE edema, nontender with pedal pulse palpable;  right shoulder with a sling, bilateral lower extremity tenderness without any obvious swelling    SKIN: Warm and well perfused, no acute rashes       Medications:            heparin   Injectable 9000 Unit(s) IV Push every 6 hours PRN  heparin   Injectable 4500 Unit(s) IV Push every 6 hours PRN  heparin  Infusion.  Unit(s)/Hr IV Continuous <Continuous>        I&O's Detail        RADIOLOGY/ Microbiology/ Labs: reviewed     I have personally provided  60 minutes of critical care time excluding time spent on separate procedures

## 2022-02-28 NOTE — H&P ADULT - HISTORY OF PRESENT ILLNESS
62 yo male with PMH of provoked DVT (5 years ago completed AC course), HLD, GERD, right rotator cuff surgery 2 week ago presented initially to the [facility] eartlier today for new left calf swelling and worsening dyspnea. Patient states that after his surgery he has been taking recurrent oxycodone and sleeping a lot in his recliner. Patient states that starting on Friday he noted that his left leg was becoming swollen, and earlier last week his wife thought he looked a little winded. Patient states that then starting last night he began experiencing severe shortness of breath and diaphoresis which rendered him unable to sleep overnight. Patient presented to [place] where he was found to have an elevated D-Dimer, and CTA showed extensive bilateral pulmonary embolisms with evidence of RV strain. Patient also with elevated troponin and BNP, and he was transferred to [facility] for further evaluation. Patient states that he had a provoked PE previously a few years ago after he hit his leg, and then flew to [place]. Patient states he was on AC short term, but never had a hypercoagulable workup.   Patient denies any fevers, chills, CP, syncope, near syncope, abdominal pain, N/V/D, headache, or dizziness.

## 2022-02-28 NOTE — ED PROVIDER NOTE - CLINICAL SUMMARY MEDICAL DECISION MAKING FREE TEXT BOX
61M hx DVT not on AC for years, HLD, HLD, s/p right rotator cuff surgery 2 weeks ago, transferred from [place] ED for Bilateral pulmonary emboli, suspicious for right heart strain, stable on heparin drip.  IC consulted.

## 2022-02-28 NOTE — CONSULT NOTE ADULT - PROBLEM SELECTOR RECOMMENDATION 9
- Extensive bilateral pulmonary embolisms.   - Elevated troponin and pBNP.   - Echocardiogram with severely dilated RV with severe RV dysfunction.   - Repeat troponin, pBNP, and lactate are pending.   - Provoked BNP post-operatively, but recurrent DVT.   - Will need long-term AC.   - Would obtain hypercoagulable workup.   - Continue heparin gtt.   - NPO after midnight.   - Plan for mechanical thrombectomy tomorrow.   - Obtain LLE venous duplex.   - Repeat troponin, pBNP, and lactate in the AM.

## 2022-03-01 DIAGNOSIS — I10 ESSENTIAL (PRIMARY) HYPERTENSION: ICD-10-CM

## 2022-03-01 DIAGNOSIS — E78.5 HYPERLIPIDEMIA, UNSPECIFIED: ICD-10-CM

## 2022-03-01 LAB
A1C WITH ESTIMATED AVERAGE GLUCOSE RESULT: 6.2 % — HIGH (ref 4–5.6)
ALBUMIN SERPL ELPH-MCNC: 4 G/DL — SIGNIFICANT CHANGE UP (ref 3.3–5.2)
ALP SERPL-CCNC: 107 U/L — SIGNIFICANT CHANGE UP (ref 40–120)
ALT FLD-CCNC: 16 U/L — SIGNIFICANT CHANGE UP
ANION GAP SERPL CALC-SCNC: 14 MMOL/L — SIGNIFICANT CHANGE UP (ref 5–17)
APTT BLD: 39.3 SEC — HIGH (ref 27.5–35.5)
APTT BLD: 49.6 SEC — HIGH (ref 27.5–35.5)
AST SERPL-CCNC: 18 U/L — SIGNIFICANT CHANGE UP
BASOPHILS # BLD AUTO: 0.1 K/UL — SIGNIFICANT CHANGE UP (ref 0–0.2)
BASOPHILS NFR BLD AUTO: 1.1 % — SIGNIFICANT CHANGE UP (ref 0–2)
BILIRUB SERPL-MCNC: 0.5 MG/DL — SIGNIFICANT CHANGE UP (ref 0.4–2)
BUN SERPL-MCNC: 16.7 MG/DL — SIGNIFICANT CHANGE UP (ref 8–20)
CALCIUM SERPL-MCNC: 9.1 MG/DL — SIGNIFICANT CHANGE UP (ref 8.6–10.2)
CHLORIDE SERPL-SCNC: 105 MMOL/L — SIGNIFICANT CHANGE UP (ref 98–107)
CHOLEST SERPL-MCNC: 141 MG/DL — SIGNIFICANT CHANGE UP
CO2 SERPL-SCNC: 20 MMOL/L — LOW (ref 22–29)
CREAT SERPL-MCNC: 0.92 MG/DL — SIGNIFICANT CHANGE UP (ref 0.5–1.3)
EGFR: 95 ML/MIN/1.73M2 — SIGNIFICANT CHANGE UP
EOSINOPHIL # BLD AUTO: 0.17 K/UL — SIGNIFICANT CHANGE UP (ref 0–0.5)
EOSINOPHIL NFR BLD AUTO: 1.8 % — SIGNIFICANT CHANGE UP (ref 0–6)
ESTIMATED AVERAGE GLUCOSE: 131 MG/DL — HIGH (ref 68–114)
GLUCOSE SERPL-MCNC: 127 MG/DL — HIGH (ref 70–99)
HCT VFR BLD CALC: 40.5 % — SIGNIFICANT CHANGE UP (ref 39–50)
HCT VFR BLD CALC: 40.5 % — SIGNIFICANT CHANGE UP (ref 39–50)
HCV AB S/CO SERPL IA: 0.08 S/CO — SIGNIFICANT CHANGE UP (ref 0–0.99)
HCV AB SERPL-IMP: SIGNIFICANT CHANGE UP
HDLC SERPL-MCNC: 32 MG/DL — LOW
HGB BLD-MCNC: 13.7 G/DL — SIGNIFICANT CHANGE UP (ref 13–17)
HGB BLD-MCNC: 14 G/DL — SIGNIFICANT CHANGE UP (ref 13–17)
IMM GRANULOCYTES NFR BLD AUTO: 0.4 % — SIGNIFICANT CHANGE UP (ref 0–1.5)
LIPID PNL WITH DIRECT LDL SERPL: 82 MG/DL — SIGNIFICANT CHANGE UP
LYMPHOCYTES # BLD AUTO: 3.12 K/UL — SIGNIFICANT CHANGE UP (ref 1–3.3)
LYMPHOCYTES # BLD AUTO: 32.8 % — SIGNIFICANT CHANGE UP (ref 13–44)
MCHC RBC-ENTMCNC: 30.4 PG — SIGNIFICANT CHANGE UP (ref 27–34)
MCHC RBC-ENTMCNC: 30.8 PG — SIGNIFICANT CHANGE UP (ref 27–34)
MCHC RBC-ENTMCNC: 33.8 GM/DL — SIGNIFICANT CHANGE UP (ref 32–36)
MCHC RBC-ENTMCNC: 34.6 GM/DL — SIGNIFICANT CHANGE UP (ref 32–36)
MCV RBC AUTO: 89 FL — SIGNIFICANT CHANGE UP (ref 80–100)
MCV RBC AUTO: 90 FL — SIGNIFICANT CHANGE UP (ref 80–100)
MONOCYTES # BLD AUTO: 0.73 K/UL — SIGNIFICANT CHANGE UP (ref 0–0.9)
MONOCYTES NFR BLD AUTO: 7.7 % — SIGNIFICANT CHANGE UP (ref 2–14)
NEUTROPHILS # BLD AUTO: 5.36 K/UL — SIGNIFICANT CHANGE UP (ref 1.8–7.4)
NEUTROPHILS NFR BLD AUTO: 56.2 % — SIGNIFICANT CHANGE UP (ref 43–77)
NON HDL CHOLESTEROL: 109 MG/DL — SIGNIFICANT CHANGE UP
PLATELET # BLD AUTO: 250 K/UL — SIGNIFICANT CHANGE UP (ref 150–400)
PLATELET # BLD AUTO: 258 K/UL — SIGNIFICANT CHANGE UP (ref 150–400)
POTASSIUM SERPL-MCNC: 4.4 MMOL/L — SIGNIFICANT CHANGE UP (ref 3.5–5.3)
POTASSIUM SERPL-SCNC: 4.4 MMOL/L — SIGNIFICANT CHANGE UP (ref 3.5–5.3)
PROT SERPL-MCNC: 6.8 G/DL — SIGNIFICANT CHANGE UP (ref 6.6–8.7)
RBC # BLD: 4.5 M/UL — SIGNIFICANT CHANGE UP (ref 4.2–5.8)
RBC # BLD: 4.55 M/UL — SIGNIFICANT CHANGE UP (ref 4.2–5.8)
RBC # FLD: 11.8 % — SIGNIFICANT CHANGE UP (ref 10.3–14.5)
RBC # FLD: 11.9 % — SIGNIFICANT CHANGE UP (ref 10.3–14.5)
SODIUM SERPL-SCNC: 139 MMOL/L — SIGNIFICANT CHANGE UP (ref 135–145)
TRIGL SERPL-MCNC: 136 MG/DL — SIGNIFICANT CHANGE UP
WBC # BLD: 9.47 K/UL — SIGNIFICANT CHANGE UP (ref 3.8–10.5)
WBC # BLD: 9.52 K/UL — SIGNIFICANT CHANGE UP (ref 3.8–10.5)
WBC # FLD AUTO: 9.47 K/UL — SIGNIFICANT CHANGE UP (ref 3.8–10.5)
WBC # FLD AUTO: 9.52 K/UL — SIGNIFICANT CHANGE UP (ref 3.8–10.5)

## 2022-03-01 PROCEDURE — 99233 SBSQ HOSP IP/OBS HIGH 50: CPT

## 2022-03-01 PROCEDURE — 99152 MOD SED SAME PHYS/QHP 5/>YRS: CPT | Mod: 59

## 2022-03-01 PROCEDURE — 93970 EXTREMITY STUDY: CPT | Mod: 26

## 2022-03-01 PROCEDURE — 93308 TTE F-UP OR LMTD: CPT | Mod: 26

## 2022-03-01 PROCEDURE — 75743 ARTERY X-RAYS LUNGS: CPT | Mod: 26,59

## 2022-03-01 PROCEDURE — 36014 PLACE CATHETER IN ARTERY: CPT

## 2022-03-01 PROCEDURE — 37184 PRIM ART M-THRMBC 1ST VSL: CPT | Mod: 50

## 2022-03-01 RX ORDER — HEPARIN SODIUM 5000 [USP'U]/ML
1700 INJECTION INTRAVENOUS; SUBCUTANEOUS
Qty: 25000 | Refills: 0 | Status: DISCONTINUED | OUTPATIENT
Start: 2022-03-01 | End: 2022-03-01

## 2022-03-01 RX ORDER — INFLUENZA VIRUS VACCINE 15; 15; 15; 15 UG/.5ML; UG/.5ML; UG/.5ML; UG/.5ML
0.5 SUSPENSION INTRAMUSCULAR ONCE
Refills: 0 | Status: DISCONTINUED | OUTPATIENT
Start: 2022-03-01 | End: 2022-03-03

## 2022-03-01 RX ORDER — APIXABAN 2.5 MG/1
10 TABLET, FILM COATED ORAL EVERY 12 HOURS
Refills: 0 | Status: DISCONTINUED | OUTPATIENT
Start: 2022-03-01 | End: 2022-03-03

## 2022-03-01 RX ORDER — APIXABAN 2.5 MG/1
5 TABLET, FILM COATED ORAL EVERY 12 HOURS
Refills: 0 | Status: CANCELLED | OUTPATIENT
Start: 2022-03-08 | End: 2022-03-03

## 2022-03-01 RX ADMIN — SERTRALINE 50 MILLIGRAM(S): 25 TABLET, FILM COATED ORAL at 18:55

## 2022-03-01 RX ADMIN — ATORVASTATIN CALCIUM 20 MILLIGRAM(S): 80 TABLET, FILM COATED ORAL at 21:05

## 2022-03-01 RX ADMIN — HEPARIN SODIUM 17 UNIT(S)/HR: 5000 INJECTION INTRAVENOUS; SUBCUTANEOUS at 15:56

## 2022-03-01 RX ADMIN — PANTOPRAZOLE SODIUM 40 MILLIGRAM(S): 20 TABLET, DELAYED RELEASE ORAL at 05:07

## 2022-03-01 RX ADMIN — HEPARIN SODIUM 17 UNIT(S)/HR: 5000 INJECTION INTRAVENOUS; SUBCUTANEOUS at 02:19

## 2022-03-01 RX ADMIN — APIXABAN 10 MILLIGRAM(S): 2.5 TABLET, FILM COATED ORAL at 21:05

## 2022-03-01 NOTE — PROGRESS NOTE ADULT - SUBJECTIVE AND OBJECTIVE BOX
[facility]                                                         CARDIOLOGY AT [facility]                                                                  [address], [place] [address]                                                         Telephone: [phone]. Fax:[phone]                                                                             PROGRESS NOTE    Reason for follow up: B/L pulmonary embolism  Update: The patient had mechanical thrombectomy (Inari) of the B/L pulmonary embolism    Review of symptoms:   Cardiac:  No chest pain. No dyspnea. No palpitations.  Respiratory: no cough. No dyspnea  Gastrointestinal: No diarrhea. No abdominal pain. No bleeding.   Neuro: No focal neuro complaints.    Vitals:  T(C): 36.7 (03-01-22 @ 07:22), Max: 36.7 (02-28-22 @ 12:25)  HR: 96 (03-01-22 @ 07:22) (96 - 126)  BP: 125/85 (03-01-22 @ 07:22) (101/51 - 165/85)  RR: 16 (03-01-22 @ 07:22) (16 - 22)  SpO2: 98% (03-01-22 @ 07:22) (93% - 98%)  Wt(kg): --  I&O's Summary    28 Feb 2022 07:01  -  01 Mar 2022 07:00  --------------------------------------------------------  IN: 368 mL / OUT: 500 mL / NET: -132 mL      Weight (kg): 111.4 (02-28 @ 13:48), 111.1 (02-28 @ 10:08)    PHYSICAL EXAM:  Appearance: Comfortable. No acute distress  HEENT:  Atraumatic. Normocephalic.  Normal oral mucosa  Neurologic: A & O x 3, no gross focal deficits.  Cardiovascular: RRR S1 S2, No murmur, no rubs/gallops. No JVD  Respiratory: Lungs clear to auscultation, unlabored   Gastrointestinal:  Soft, Non-tender, + BS  Right Groin: Soft, no bleeding, no hematoma  Lower Extremities: No edema, + distal pulses  Psychiatry: Patient is calm. No agitation.   Skin: warm and dry.    CURRENT MEDICATIONS:    sertraline  pantoprazole    Tablet  atorvastatin  heparin  Infusion  influenza   Vaccine  lactated ringers.      LABS:	 	  High Sensitivity Troponin I: 1045  p-BNP 28 Feb 2022 10:40: 392 pg/mL                          14.0   9.52  )-----------( 250      ( 01 Mar 2022 06:24 )             40.5     03-01    139  |  105  |  16.7  ----------------------------<  127  4.4   |  20.0  |  0.92    Ca    9.1      01 Mar 2022 06:24    TPro  6.8  /  Alb  4.0  /  TBili  0.5  /  DBili  x   /  AST  18  /  ALT  16  /  AlkPhos  107  03-01    proBNP: Serum Pro-Brain Natriuretic Peptide: 392 pg/mL (02-28 @ 10:40)    Lipid Profile: Date: 03-01 @ 06:24       Total Cholesterol: 141       Triglycerides: 136       HDL: 32       Non-HDL: 109       LDL: 82    HgA1c: 6.2%  TSH:     TELEMETRY:   ECG:  	    DIAGNOSTIC TESTING:  [X] Echocardiogram:   Left Ventricle: The left ventricular internal cavity size is normal. Left ventricular wall thickness is mildly increased. Global LV systolic function was normal. Left ventricular ejection fraction, by visual estimation, is 60 to 65%. The interventricular septum is flattened in systole and diastole, consistent with right ventricular pressure and volume overload. The left ventricular diastolic function could not be assessed in this study.  Right Ventricle: The right ventricular size is severely enlarged. RV systolic function is severely reduced. Positive McConnells sign is visualized.  Left Atrium: The left atrium is normal in size.  Right Atrium: The right atrium is normal in size.  Pericardium: There is no evidence of pericardial effusion.  Mitral Valve: Structurally normal mitral valve, with normal leaflet excursion. Trace mitral valve regurgitation is seen.  Tricuspid Valve: Structurally normal tricuspid valve, with normal leaflet excursion. Mild tricuspid regurgitation is visualized.  Aortic Valve: The aortic valve is trileaflet. Sclerotic aortic valve with normal opening. No evidence of aortic valve regurgitation is seen.  Pulmonic Valve: The pulmonic valve was not well visualized. Trace pulmonic valve regurgitation.  Aorta: The aortic root is normal in size and structure.  Pulmonary Artery: The main pulmonary artery is normal in size.  Venous: The inferior vena cava was dilated, with respiratory size variation less than 50%.  In comparison to the previous echocardiogram(s): There are no prior studies on this patient for comparison purposes.    [ ]  Catheterization:  [ ] Stress Test:    OTHER: 	                                                                [facility]YSIC[facility]                                                         CARDIOLOGY AT [facility]                                                                  [address] [facility], [place] [address]                                                         Telephone: [phone]. Fax:[phone]                                                                             PROGRESS NOTE    Reason for follow up: B/L pulmonary embolism  Update: The patient had mechanical thrombectomy (Inari) of the B/L pulmonary embolism    Review of symptoms:   Cardiac:  No chest pain. No dyspnea. No palpitations.  Respiratory: no cough. No dyspnea  Gastrointestinal: No diarrhea. No abdominal pain. No bleeding.   Neuro: No focal neuro complaints.    Vitals:  T(C): 36.7 (03-01-22 @ 07:22), Max: 36.7 (02-28-22 @ 12:25)  HR: 96 (03-01-22 @ 07:22) (96 - 126)  BP: 125/85 (03-01-22 @ 07:22) (101/51 - 165/85)  RR: 16 (03-01-22 @ 07:22) (16 - 22)  SpO2: 98% (03-01-22 @ 07:22) (93% - 98%)    I&O's Summary  28 Feb 2022 07:01  -  01 Mar 2022 07:00  --------------------------------------------------------  IN: 368 mL / OUT: 500 mL / NET: -132 mL    Weight (kg): 111.4 (02-28 @ 13:48), 111.1 (02-28 @ 10:08)    PHYSICAL EXAM:  Appearance: Comfortable. No acute distress  HEENT:  Atraumatic. Normocephalic.  Normal oral mucosa  Neurologic: A & O x 3, no gross focal deficits.  Cardiovascular: RRR S1 S2, No murmur, no rubs/gallops. No JVD  Respiratory: Lungs clear to auscultation, unlabored   Gastrointestinal:  Soft, Non-tender, + BS  Right Groin: Figure 8 stitch, site soft, no bleeding, no hematoma  Lower Extremities: No edema, + distal pulses  Psychiatry: Patient is calm. No agitation.   Skin: warm and dry.    CURRENT MEDICATIONS:    sertraline  pantoprazole    Tablet  atorvastatin  heparin  Infusion  influenza   Vaccine  lactated ringers.      LABS:	 	  High Sensitivity Troponin I: 1045  p-BNP 28 Feb 2022 10:40: 392 pg/mL                          14.0   9.52  )-----------( 250      ( 01 Mar 2022 06:24 )             40.5     03-01    139  |  105  |  16.7  ----------------------------<  127  4.4   |  20.0  |  0.92    Ca    9.1      01 Mar 2022 06:24    TPro  6.8  /  Alb  4.0  /  TBili  0.5  /  DBili  x   /  AST  18  /  ALT  16  /  AlkPhos  107  03-01    proBNP: Serum Pro-Brain Natriuretic Peptide: 392 pg/mL (02-28 @ 10:40)    Lipid Profile: Date: 03-01 @ 06:24       Total Cholesterol: 141       Triglycerides: 136       HDL: 32       Non-HDL: 109       LDL: 82    HgA1c: 6.2%  TSH:     TELEMETRY:   ECG:  	    DIAGNOSTIC TESTING:  [X] Echocardiogram:   Left Ventricle: The left ventricular internal cavity size is normal. Left ventricular wall thickness is mildly increased. Global LV systolic function was normal. Left ventricular ejection fraction, by visual estimation, is 60 to 65%. The interventricular septum is flattened in systole and diastole, consistent with right ventricular pressure and volume overload. The left ventricular diastolic function could not be assessed in this study.  Right Ventricle: The right ventricular size is severely enlarged. RV systolic function is severely reduced. Positive McConnells sign is visualized.  Left Atrium: The left atrium is normal in size.  Right Atrium: The right atrium is normal in size.  Pericardium: There is no evidence of pericardial effusion.  Mitral Valve: Structurally normal mitral valve, with normal leaflet excursion. Trace mitral valve regurgitation is seen.  Tricuspid Valve: Structurally normal tricuspid valve, with normal leaflet excursion. Mild tricuspid regurgitation is visualized.  Aortic Valve: The aortic valve is trileaflet. Sclerotic aortic valve with normal opening. No evidence of aortic valve regurgitation is seen.  Pulmonic Valve: The pulmonic valve was not well visualized. Trace pulmonic valve regurgitation.  Aorta: The aortic root is normal in size and structure.  Pulmonary Artery: The main pulmonary artery is normal in size.  Venous: The inferior vena cava was dilated, with respiratory size variation less than 50%.  In comparison to the previous echocardiogram(s): There are no prior studies on this patient for comparison purposes.    [ ]  Catheterization:  [ ] Stress Test:    OTHER: 	                                                                [facility]                                                         CARDIOLOGY AT [facility]                                                                  [address] [facility], [place] [place] [address]                                                         Telephone: [phone]. Fax:[phone]                                                                             PROGRESS NOTE    Reason for follow up: B/L pulmonary embolism  Update: The patient had mechanical thrombectomy (Inari) of the B/L pulmonary embolism for good amount of thrombus. He states improvement in SOB, denies chest pain.    Right Heart Pressures:                    Pre-Procedure      Post-Procedure       RA:              13                        12       RV:            47/11                    40/7        PA:            50/20/31               37/14/24       PA Sat:         60.2%                   52%     Review of symptoms:   Cardiac:  No chest pain. No dyspnea. No palpitations.  Respiratory: no cough. Improvement in SOB.  Gastrointestinal: No diarrhea. No abdominal pain. No bleeding.   Neuro: No focal neuro complaints.    Vitals:  T(C): 36.7 (03-01-22 @ 07:22), Max: 36.7 (02-28-22 @ 12:25)  HR: 96 (03-01-22 @ 07:22) (96 - 126)  BP: 125/85 (03-01-22 @ 07:22) (101/51 - 165/85)  RR: 16 (03-01-22 @ 07:22) (16 - 22)  SpO2: 98% (03-01-22 @ 07:22) (93% - 98%)    I&O's Summary  28 Feb 2022 07:01  -  01 Mar 2022 07:00  --------------------------------------------------------  IN: 368 mL / OUT: 500 mL / NET: -132 mL    Weight (kg): 111.4 (02-28 @ 13:48), 111.1 (02-28 @ 10:08)    PHYSICAL EXAM:  Appearance: Comfortable. No acute distress  HEENT:  Atraumatic. Normocephalic.  Normal oral mucosa  Neurologic: A & O x 3, no gross focal deficits.  Cardiovascular: RRR S1 S2, No murmur, no rubs/gallops. No JVD  Respiratory: Lungs clear to auscultation, unlabored   Gastrointestinal:  Soft, Non-tender, + BS  Right Groin: Figure 8 stitch, site soft, no bleeding, no hematoma  Lower Extremities: No edema, + distal pulses  Psychiatry: Patient is calm. No agitation.   Skin: warm and dry.    MEDICATIONS  (STANDING):  apixaban 10 milliGRAM(s) Oral every 12 hours  atorvastatin 20 milliGRAM(s) Oral at bedtime  heparin  Infusion 1700 Unit(s)/Hr (17 mL/Hr) IV Continuous <Continuous>  influenza   Vaccine 0.5 milliLiter(s) IntraMuscular once  lactated ringers. 1000 milliLiter(s) (75 mL/Hr) IV Continuous <Continuous>  pantoprazole    Tablet 40 milliGRAM(s) Oral before breakfast  sertraline 50 milliGRAM(s) Oral daily    MEDICATIONS  (PRN):  acetaminophen     Tablet .. 650 milliGRAM(s) Oral every 6 hours PRN Temp greater or equal to 38C (100.4F), Mild Pain (1 - 3)  heparin   Injectable 9000 Unit(s) IV Push every 6 hours PRN For aPTT less than 40  heparin   Injectable 4500 Unit(s) IV Push every 6 hours PRN For aPTT between 40 - 57  oxycodone    5 mG/acetaminophen 325 mG 1 Tablet(s) Oral every 6 hours PRN Moderate Pain (4 - 6)    Home Medications:  ATORVASTATIN 20 MG TABLET: TAKE 1 TABLET BY MOUTH EVERY DAY (28 Feb 2022 21:44)  omeprazole 20 mg oral delayed release capsule: 1 cap(s) orally once a day, As Needed (28 Feb 2022 21:44)  sertraline 50 mg oral tablet: 1 tab(s) orally once a day (28 Feb 2022 21:44)    LABS:	 	  High Sensitivity Troponin I: 1045  p-BNP 28 Feb 2022 10:40: 392 pg/mL                          14.0   9.52  )-----------( 250      ( 01 Mar 2022 06:24 )             40.5     03-01    139  |  105  |  16.7  ----------------------------<  127  4.4   |  20.0  |  0.92    Ca    9.1      01 Mar 2022 06:24    TPro  6.8  /  Alb  4.0  /  TBili  0.5  /  DBili  x   /  AST  18  /  ALT  16  /  AlkPhos  107  03-01    proBNP: Serum Pro-Brain Natriuretic Peptide: 392 pg/mL (02-28 @ 10:40)    Lipid Profile: Date: 03-01 @ 06:24       Total Cholesterol: 141       Triglycerides: 136       HDL: 32       Non-HDL: 109       LDL: 82    HgA1c: 6.2%  TSH:     TELEMETRY:   ECG:  	    DIAGNOSTIC TESTING:  [X] Echocardiogram:   Left Ventricle: The left ventricular internal cavity size is normal. Left ventricular wall thickness is mildly increased. Global LV systolic function was normal. Left ventricular ejection fraction, by visual estimation, is 60 to 65%. The interventricular septum is flattened in systole and diastole, consistent with right ventricular pressure and volume overload. The left ventricular diastolic function could not be assessed in this study.  Right Ventricle: The right ventricular size is severely enlarged. RV systolic function is severely reduced. Positive McConnells sign is visualized.  Left Atrium: The left atrium is normal in size.  Right Atrium: The right atrium is normal in size.  Pericardium: There is no evidence of pericardial effusion.  Mitral Valve: Structurally normal mitral valve, with normal leaflet excursion. Trace mitral valve regurgitation is seen.  Tricuspid Valve: Structurally normal tricuspid valve, with normal leaflet excursion. Mild tricuspid regurgitation is visualized.  Aortic Valve: The aortic valve is trileaflet. Sclerotic aortic valve with normal opening. No evidence of aortic valve regurgitation is seen.  Pulmonic Valve: The pulmonic valve was not well visualized. Trace pulmonic valve regurgitation.  Aorta: The aortic root is normal in size and structure.  Pulmonary Artery: The main pulmonary artery is normal in size.  Venous: The inferior vena cava was dilated, with respiratory size variation less than 50%.  In comparison to the previous echocardiogram(s): There are no prior studies on this patient for comparison purposes.    [ ]  Catheterization:  [ ] Stress Test:    OTHER:

## 2022-03-01 NOTE — CHART NOTE - NSCHARTNOTEFT_GEN_A_CORE
Department of Cardiology                                                                  [facility]/[place] [address] [facility]                                                            Telephone: [phone]. Fax:[phone]                                                                            Pre-Procedure Note    Procedure: Inari mechanical thrombectomy    Indication: B/L pulmonary embolism    Physician: Jeramy    Referring Physician: Jeramy    ASA: 3  Mallampati: 2    CTA of Chest:   Pulmonary Artery:  There are pulmonary emboli within the distal right main, right upper, middle, and lower lobar arteries and the segmental arteries of the right upper, middle, and lower lobes. There are pulmonary emboli in the distal left main pulmonary artery and the left upper, lingular, and lower lobar arteries and the segmental arteries of the left upper and lower lobes.  Tubes/Lines: None.  Lungs, airways and pleura: The lungs are clear. Included airways are normal. The pleura is normal.  Mediastinum: The visualized thyroid gland is normal. There are no enlarged chest lymph nodes. The right atrium right ventricle are enlarged. No pericardial effusion. Coronary calcifications.  The aorta is normal in caliber. The great vessels are normal along their imaged segments.  Upper Abdomen: The upper abdomen is normal.  Bones And Soft Tissues: The bones are unremarkable.  The soft tissues are unremarkable.    Echo:   Left Ventricle: The left ventricular internal cavity size is normal. Left ventricular wall thickness is mildly increased. Global LV systolic function was normal. Left ventricular ejection fraction, by visual estimation, is 60 to 65%. The interventricular septum   is flattened in systole and diastole, consistent with right ventricular pressure and volume overload. The left ventricular diastolic function could not be assessed in this study.  Right Ventricle: The right ventricular size is severely enlarged. RV systolic function is severely reduced. Positive McConnells sign is visualized.  Left Atrium: The left atrium is normal in size.  Right Atrium: The right atrium is normal in size.  Pericardium: There is no evidence of pericardial effusion.  Mitral Valve: Structurally normal mitral valve, with normal leaflet excursion. Trace mitral valve regurgitation is seen.  Tricuspid Valve: Structurally normal tricuspid valve, with normal leaflet excursion. Mild tricuspid regurgitation is visualized.  Aortic Valve: The aortic valve is trileaflet. Sclerotic aortic valve with normal opening. No evidence of aortic valve regurgitation is seen.  Pulmonic Valve: The pulmonic valve was not well visualized. Trace pulmonic valve regurgitation.  Aorta: The aortic root is normal in size and structure.  Pulmonary Artery: The main pulmonary artery is normal in size.  Venous: The inferior vena cava was dilated, with respiratory size variation less than 50%.  In comparison to the previous echocardiogram(s): There are no prior studies on this patient for comparison purposes.    HPI: 60 yo male with PMH of provoked DVT (5 years ago completed AC course), HLD, GERD, right rotator cuff surgery 2 week ago presented initially to the [facility] earier today for new left calf swelling and worsening dyspnea. Patient states that after his surgery he has been taking recurrent oxycodone and sleeping a lot in his recliner. Patient states that starting on Friday he noted that his left leg was becoming swollen, and earlier last week his wife thought he looked a little winded. Patient states that then starting last night he began experiencing severe shortness of breath and diaphoresis which rendered him unable to sleep overnight. Patient presented to [place] where he was found to have an elevated D-Dimer, and CTA showed extensive bilateral pulmonary embolisms with evidence of RV strain. Patient also with elevated troponin and BNP, and he was transferred to [facility] for further evaluation. Patient states that he had a provoked PE previously a few years ago after he hit his leg, and then flew to [place]. Patient states he was on AC short term, but never had a hypercoagulable workup.   Patient denies any fevers, chills, CP, syncope, near syncope, abdominal pain, N/V/D, headache, or dizziness. (28 Feb 2022 21:46)    PAST MEDICAL & SURGICAL HISTORY:  Hypercholesteremia  GERD (gastroesophageal reflux disease)  Depressed  Recurrent DVT (deep venous thrombosis)  S/P rotator cuff surgery    Allergies: No Known Allergies    MEDICATIONS  (STANDING):  atorvastatin 20 milliGRAM(s) Oral at bedtime  heparin  Infusion 1700 Unit(s)/Hr (17 mL/Hr) IV Continuous <Continuous>  influenza   Vaccine 0.5 milliLiter(s) IntraMuscular once  lactated ringers. 1000 milliLiter(s) (75 mL/Hr) IV Continuous <Continuous>  pantoprazole    Tablet 40 milliGRAM(s) Oral before breakfast  sertraline 50 milliGRAM(s) Oral daily    MEDICATIONS  (PRN):  acetaminophen     Tablet .. 650 milliGRAM(s) Oral every 6 hours PRN Temp greater or equal to 38C (100.4F), Mild Pain (1 - 3)  heparin   Injectable 9000 Unit(s) IV Push every 6 hours PRN For aPTT less than 40  heparin   Injectable 4500 Unit(s) IV Push every 6 hours PRN For aPTT between 40 - 57  oxycodone    5 mG/acetaminophen 325 mG 1 Tablet(s) Oral every 6 hours PRN Moderate Pain (4 - 6)    Home Medications:  ATORVASTATIN 20 MG TABLET: TAKE 1 TABLET BY MOUTH EVERY DAY (28 Feb 2022 21:44)  omeprazole 20 mg oral delayed release capsule: 1 cap(s) orally once a day, As Needed (28 Feb 2022 21:44)  sertraline 50 mg oral tablet: 1 tab(s) orally once a day (28 Feb 2022 21:44)    T(C): 36.5 (03-01-22 @ 03:00), Max: 37.1 (02-28-22 @ 10:08)  HR: 97 (03-01-22 @ 05:00) (97 - 134)  BP: 124/90 (03-01-22 @ 05:00) (101/51 - 165/85)  RR: 18 (03-01-22 @ 05:00) (16 - 22)  SpO2: 98% (03-01-22 @ 05:00) (91% - 98%)    CM: SR  General: Awake, alert, speech clear, no acute distress  Chest: Clear, RRR, S1, S2, no murmur  Extremities: No edema  Neurological: A&OX3                          14.0   9.52  )-----------( 250      ( 01 Mar 2022 06:24 )             40.5     03-01    139  |  105  |  16.7  ----------------------------<  127  4.4   |  20.0  |  0.92    Ca    9.1      01 Mar 2022 06:24    TPro  6.8  /  Alb  4.0  /  TBili  0.5  /  DBili  x   /  AST  18  /  ALT  16  /  AlkPhos  107  03-01    PT/INR - ( 28 Feb 2022 13:48 )   PT: 13.2 sec;   INR: 1.14 ratio    PTT - ( 01 Mar 2022 01:15 )  PTT:49.6 sec    Problem List/Plan:  1. B/L pulmonary embolism  Mechanical thrombectomy  Continue heparin drip  Transition to NOAC
Called out to Dr. Flores by radiologist with finding of DVT on Ultrasound of LLE. Pt. is on IV anticoagulation being treated for bilateral PE. Vital are wnl. Pt. is without any complaints.

## 2022-03-01 NOTE — PROGRESS NOTE ADULT - SUBJECTIVE AND OBJECTIVE BOX
Patient is a 61y old  Male who presents with a chief complaint of High Risk PE (01 Mar 2022 10:08)      Patient seen and examined at bedside. No overnight events reported.     ALLERGIES:  No Known Allergies    MEDICATIONS  (STANDING):  apixaban 10 milliGRAM(s) Oral every 12 hours  atorvastatin 20 milliGRAM(s) Oral at bedtime  heparin  Infusion 1700 Unit(s)/Hr (17 mL/Hr) IV Continuous <Continuous>  influenza   Vaccine 0.5 milliLiter(s) IntraMuscular once  lactated ringers. 1000 milliLiter(s) (75 mL/Hr) IV Continuous <Continuous>  pantoprazole    Tablet 40 milliGRAM(s) Oral before breakfast  sertraline 50 milliGRAM(s) Oral daily    MEDICATIONS  (PRN):  acetaminophen     Tablet .. 650 milliGRAM(s) Oral every 6 hours PRN Temp greater or equal to 38C (100.4F), Mild Pain (1 - 3)  heparin   Injectable 9000 Unit(s) IV Push every 6 hours PRN For aPTT less than 40  heparin   Injectable 4500 Unit(s) IV Push every 6 hours PRN For aPTT between 40 - 57  oxycodone    5 mG/acetaminophen 325 mG 1 Tablet(s) Oral every 6 hours PRN Moderate Pain (4 - 6)    Vital Signs Last 24 Hrs  T(F): 98 (01 Mar 2022 07:22), Max: 98.1 (28 Feb 2022 20:01)  HR: 101 (01 Mar 2022 17:00) (90 - 116)  BP: 112/77 (01 Mar 2022 16:00) (104/75 - 165/85)  RR: 15 (01 Mar 2022 17:00) (15 - 20)  SpO2: 97% (01 Mar 2022 17:00) (93% - 100%)  I&O's Summary    28 Feb 2022 07:01  -  01 Mar 2022 07:00  --------------------------------------------------------  IN: 368 mL / OUT: 500 mL / NET: -132 mL        PHYSICAL EXAM:  General: awake alert   Neck: supple , no JVD   Lungs: CTa bilateral   Cardio: RRR, S1/S2, No murmur  Abdomen: Soft, Nontender, Nondistended; Bowel sounds present  Extremities: No calf tenderness, No pitting edema  right leg pain swelling   LABS:                        14.0   9.52  )-----------( 250      ( 01 Mar 2022 06:24 )             40.5     03-01    139  |  105  |  16.7  ----------------------------<  127  4.4   |  20.0  |  0.92    Ca    9.1      01 Mar 2022 06:24    TPro  6.8  /  Alb  4.0  /  TBili  0.5  /  DBili  x   /  AST  18  /  ALT  16  /  AlkPhos  107  03-01          PT/INR - ( 28 Feb 2022 13:48 )   PT: 13.2 sec;   INR: 1.14 ratio         PTT - ( 01 Mar 2022 01:15 )  PTT:49.6 sec      CARDIAC MARKERS ( 28 Feb 2022 10:40 )  x     / 1045.1 ng/L / x     / x     / x          03-01 Chol 141 mg/dL LDL -- HDL 32 mg/dL Trig 136 mg/dL    19:35 - VBG - pH:       | pCO2:       | pO2:       | Lactate: 2.10                     COVID-19 PCR: NotDetec (02-28-22 @ 11:04)    RADIOLOGY & ADDITIONAL TESTS:

## 2022-03-01 NOTE — PROGRESS NOTE ADULT - PROBLEM SELECTOR PLAN 1
S/P mechanical thrombectomy.  Remove figure 8 stitch at 3:00PM.  Bed rest for 2 hours post removal of stitch  IV Anticoagulation: Continue heparin drip and D/C with first dose of Eliquis.  Oral Anticoagulation: Start Eliquis 10 mg BID for 7 days, then 5 mg BID.  Echo: In AM  Would obtain hypercoagulable workup  LLE venous doppler

## 2022-03-01 NOTE — PATIENT PROFILE ADULT - FALL HARM RISK - HARM RISK INTERVENTIONS

## 2022-03-02 ENCOUNTER — TRANSCRIPTION ENCOUNTER (OUTPATIENT)
Age: 62
End: 2022-03-02

## 2022-03-02 LAB
ANION GAP SERPL CALC-SCNC: 14 MMOL/L — SIGNIFICANT CHANGE UP (ref 5–17)
BUN SERPL-MCNC: 18.2 MG/DL — SIGNIFICANT CHANGE UP (ref 8–20)
CALCIUM SERPL-MCNC: 8.9 MG/DL — SIGNIFICANT CHANGE UP (ref 8.6–10.2)
CHLORIDE SERPL-SCNC: 104 MMOL/L — SIGNIFICANT CHANGE UP (ref 98–107)
CO2 SERPL-SCNC: 22 MMOL/L — SIGNIFICANT CHANGE UP (ref 22–29)
CREAT SERPL-MCNC: 0.99 MG/DL — SIGNIFICANT CHANGE UP (ref 0.5–1.3)
EGFR: 87 ML/MIN/1.73M2 — SIGNIFICANT CHANGE UP
GLUCOSE SERPL-MCNC: 111 MG/DL — HIGH (ref 70–99)
HCT VFR BLD CALC: 35.3 % — LOW (ref 39–50)
HCYS SERPL-MCNC: 9 UMOL/L — SIGNIFICANT CHANGE UP
HGB BLD-MCNC: 12 G/DL — LOW (ref 13–17)
MCHC RBC-ENTMCNC: 30.4 PG — SIGNIFICANT CHANGE UP (ref 27–34)
MCHC RBC-ENTMCNC: 34 GM/DL — SIGNIFICANT CHANGE UP (ref 32–36)
MCV RBC AUTO: 89.4 FL — SIGNIFICANT CHANGE UP (ref 80–100)
NT-PROBNP SERPL-SCNC: 432 PG/ML — HIGH (ref 0–300)
PLATELET # BLD AUTO: 219 K/UL — SIGNIFICANT CHANGE UP (ref 150–400)
POTASSIUM SERPL-MCNC: 4.2 MMOL/L — SIGNIFICANT CHANGE UP (ref 3.5–5.3)
POTASSIUM SERPL-SCNC: 4.2 MMOL/L — SIGNIFICANT CHANGE UP (ref 3.5–5.3)
RBC # BLD: 3.95 M/UL — LOW (ref 4.2–5.8)
RBC # FLD: 11.8 % — SIGNIFICANT CHANGE UP (ref 10.3–14.5)
SODIUM SERPL-SCNC: 140 MMOL/L — SIGNIFICANT CHANGE UP (ref 135–145)
TROPONIN T SERPL-MCNC: 0.03 NG/ML — SIGNIFICANT CHANGE UP (ref 0–0.06)
WBC # BLD: 7.12 K/UL — SIGNIFICANT CHANGE UP (ref 3.8–10.5)
WBC # FLD AUTO: 7.12 K/UL — SIGNIFICANT CHANGE UP (ref 3.8–10.5)

## 2022-03-02 PROCEDURE — 99232 SBSQ HOSP IP/OBS MODERATE 35: CPT

## 2022-03-02 PROCEDURE — 93010 ELECTROCARDIOGRAM REPORT: CPT

## 2022-03-02 RX ORDER — APIXABAN 2.5 MG/1
2 TABLET, FILM COATED ORAL
Qty: 72 | Refills: 0
Start: 2022-03-02

## 2022-03-02 RX ORDER — ATORVASTATIN CALCIUM 80 MG/1
0 TABLET, FILM COATED ORAL
Qty: 0 | Refills: 0 | DISCHARGE

## 2022-03-02 RX ORDER — APIXABAN 2.5 MG/1
2 TABLET, FILM COATED ORAL
Qty: 0 | Refills: 0 | DISCHARGE
Start: 2022-03-02

## 2022-03-02 RX ORDER — ACETAMINOPHEN 500 MG
2 TABLET ORAL
Qty: 0 | Refills: 0 | DISCHARGE
Start: 2022-03-02

## 2022-03-02 RX ORDER — ATORVASTATIN CALCIUM 80 MG/1
1 TABLET, FILM COATED ORAL
Qty: 0 | Refills: 0 | DISCHARGE

## 2022-03-02 RX ORDER — ONDANSETRON 8 MG/1
4 TABLET, FILM COATED ORAL EVERY 6 HOURS
Refills: 0 | Status: DISCONTINUED | OUTPATIENT
Start: 2022-03-02 | End: 2022-03-03

## 2022-03-02 RX ADMIN — Medication 30 MILLILITER(S): at 13:28

## 2022-03-02 RX ADMIN — ATORVASTATIN CALCIUM 20 MILLIGRAM(S): 80 TABLET, FILM COATED ORAL at 22:07

## 2022-03-02 RX ADMIN — PANTOPRAZOLE SODIUM 40 MILLIGRAM(S): 20 TABLET, DELAYED RELEASE ORAL at 06:22

## 2022-03-02 RX ADMIN — ONDANSETRON 4 MILLIGRAM(S): 8 TABLET, FILM COATED ORAL at 13:23

## 2022-03-02 RX ADMIN — APIXABAN 10 MILLIGRAM(S): 2.5 TABLET, FILM COATED ORAL at 08:40

## 2022-03-02 RX ADMIN — SERTRALINE 50 MILLIGRAM(S): 25 TABLET, FILM COATED ORAL at 11:55

## 2022-03-02 RX ADMIN — APIXABAN 10 MILLIGRAM(S): 2.5 TABLET, FILM COATED ORAL at 22:07

## 2022-03-02 NOTE — PROGRESS NOTE ADULT - PROBLEM SELECTOR PLAN 1
Transitioned to Eliquis  Advised importance of taking med and not skipping doses  Echo done last night await results  DVT left leg  c/w anticoagulation  Asked patient to ambulate in room  possible dc later today if stable Transitioned to Eliquis  Advised importance of taking med and not skipping doses  Echo done last night await results  DVT left leg  c/w anticoagulation  out patient anticoagulation work up   patient is aware he needs to follow up with Hematology  Asked patient to ambulate in room  possible dc later today if stable

## 2022-03-02 NOTE — PROGRESS NOTE ADULT - SUBJECTIVE AND OBJECTIVE BOX
[facility]                                                         CARDIOLOGY AT [facility]                                                                  [address], [place] [address]                                                         Telephone: [phone]. Fax:[phone]                                                                             PROGRESS NOTE    Reason for follow up: On PE  Update: Feeling good  + dvt left leg  o2 sat on RA 96%  Echo  done last night  results pending      Review of symptoms:   Cardiac:  No chest pain. No dyspnea. No palpitations.  Respiratory: no cough. No dyspnea  Gastrointestinal: No diarrhea. No abdominal pain. No bleeding.   Neuro: No focal neuro complaints.      Vitals:  T(C): 36.7 (03-02-22 @ 04:00), Max: 36.7 (03-01-22 @ 18:31)  HR: 76 (03-02-22 @ 04:00) (76 - 105)  BP: 125/80 (03-02-22 @ 04:00) (13/76 - 130/82)  RR: 18 (03-02-22 @ 04:00) (15 - 18)  SpO2: 97% (03-02-22 @ 04:00) (93% - 100%)  Wt(kg): --  I&O's Summary    01 Mar 2022 07:01  -  02 Mar 2022 07:00  --------------------------------------------------------  IN: 0 mL / OUT: 600 mL / NET: -600 mL      Weight (kg): 111.4 (02-28 @ 13:48), 111.1 (02-28 @ 10:08)      PHYSICAL EXAM:  Appearance: Comfortable. No acute distress  HEENT:  Atraumatic. Normocephalic.  Normal oral mucosa  Neurologic: A & O x 3, no gross focal deficits.  Cardiovascular: RRR   Respiratory: Lungs clear to auscultation, unlabored   Gastrointestinal:  Soft, Non-tender, + BS  soft suture was taken out no hematoma  Lower Extremities: No edema  Left calf tenderness  Psychiatry: Patient is calm. No agitation.   Skin: warm and dry.      CURRENT MEDICATIONS:  MEDICATIONS  (STANDING):  apixaban 10 milliGRAM(s) Oral every 12 hours  atorvastatin 20 milliGRAM(s) Oral at bedtime  influenza   Vaccine 0.5 milliLiter(s) IntraMuscular once  pantoprazole    Tablet 40 milliGRAM(s) Oral before breakfast  sertraline 50 milliGRAM(s) Oral daily      LABS:	 	  CARDIAC MARKERS ( 28 Feb 2022 10:40 )  x     / x     / x     / x     / x      p-BNP 28 Feb 2022 10:40: 392 pg/mL                          12.0   7.12  )-----------( 219      ( 02 Mar 2022 07:02 )             35.3     03-02    140  |  104  |  18.2  ----------------------------<  111<H>  4.2   |  22.0  |  0.99    Ca    8.9      02 Mar 2022 07:02    TPro  6.8  /  Alb  4.0  /  TBili  0.5  /  DBili  x   /  AST  18  /  ALT  16  /  AlkPhos  107  03-01    proBNP: Serum Pro-Brain Natriuretic Peptide: 392 pg/mL (02-28 @ 10:40)    Lipid Profile: Date: 03-01 @ 06:24  Total cholesterol 141; Direct LDL: --; HDL: 32; Triglycerides:136    TELEMETRY: NSR with occasional PVC couplet  ECG:  	      < from: US Duplex Venous Lower Ext Complete, Bilateral (03.01.22 @ 20:47) >  Acute deep venous thrombosis in the left lower extremity: below the knee.    No evidence of deep venous thrombosis in the right lower extremity.    Findings were discussed with Dr. Flores on 3/1/2022 at 8:55 PM by Dr. Dupont with readback confirmation.    < end of copied text >  < from: CT Angio Chest PE Protocol w/ IV Cont (02.28.22 @ 11:47) >    1.  Bilateral pulmonary emboli.  2.  The findings are suspicious for right heart strain.  3.  The findings were discussed with read back verification obtained from   Dr. Garces and 2/28/2022 at 1153 hours.    < from: TTE Echo Complete w/o Contrast w/ Doppler (02.28.22 @ 13:12) >  Summary:   1. Technically difficult study.   2. Left ventricular ejection fraction, by visual estimation, is 60 to   65%.   3. Normal global left ventricular systolic function.   4. Mildly increased LV wall thickness.   5. The left ventricular diastolic function could not be assessed in this   study.   6. Septal flattening due to right ventricular volume and pressure   overload.   7. Severely enlarged right ventricle with severely reduced RV systolic   function (TAPSE 1.2 cm), estimated PASP 37 mmHg.   8. Trace mitral valve regurgitation.   9. Sclerotic aortic valve with normal opening.  10. Mild tricuspid regurgitation.  11. There is no evidence of pericardial effusion.  12. Right heart strain present.

## 2022-03-02 NOTE — DISCHARGE NOTE PROVIDER - NSDCMRMEDTOKEN_GEN_ALL_CORE_FT
acetaminophen 325 mg oral tablet: 2 tab(s) orally every 6 hours, As needed, Temp greater or equal to 38C (100.4F), Mild Pain (1 - 3)  apixaban 5 mg oral tablet: 2 tab(s) orally every 12 hours for 6 days   then 1 tabs twice daily   ATORVASTATIN 20 MG TABLET: 1 each orally once a day  omeprazole 20 mg oral delayed release capsule: 1 cap(s) orally once a day, As Needed  sertraline 50 mg oral tablet: 1 tab(s) orally once a day   acetaminophen 325 mg oral tablet: 2 tab(s) orally every 6 hours, As needed, Temp greater or equal to 38C (100.4F), Mild Pain (1 - 3)  apixaban 5 mg oral tablet: 2 tab(s) orally every 12 hours for 6 days   then 1 tabs twice daily   ATORVASTATIN 20 MG TABLET: 1 each orally once a day  pantoprazole 40 mg oral delayed release tablet: 1 tab(s) orally once a day (before a meal)  sertraline 50 mg oral tablet: 1 tab(s) orally once a day

## 2022-03-02 NOTE — DISCHARGE NOTE PROVIDER - CARE PROVIDER_API CALL
Rich Deshpande)  Cardiovascular Disease  [address], [address]  [place], [place] [id number]  Phone: [phone]  Fax: [phone]  Follow Up Time: 2 weeks

## 2022-03-02 NOTE — PROGRESS NOTE ADULT - SUBJECTIVE AND OBJECTIVE BOX
Patient is a 61y old  Male who presents with PE   pt seen in am he was doing well , no complaints   now with chest pain mid sternum feels like indigestion , + nausea   feels sick , dizzy on standing while getting ready to dress up to go home    discharge canceled   ECG ordered   will cont cardiac monitor , overnight monitoring         ALLERGIES:  No Known Allergies    MEDICATIONS  (STANDING):  apixaban 10 milliGRAM(s) Oral every 12 hours  atorvastatin 20 milliGRAM(s) Oral at bedtime  heparin  Infusion 1700 Unit(s)/Hr (17 mL/Hr) IV Continuous <Continuous>  influenza   Vaccine 0.5 milliLiter(s) IntraMuscular once  lactated ringers. 1000 milliLiter(s) (75 mL/Hr) IV Continuous <Continuous>  pantoprazole    Tablet 40 milliGRAM(s) Oral before breakfast  sertraline 50 milliGRAM(s) Oral daily    MEDICATIONS  (PRN):  acetaminophen     Tablet .. 650 milliGRAM(s) Oral every 6 hours PRN Temp greater or equal to 38C (100.4F), Mild Pain (1 - 3)  heparin   Injectable 9000 Unit(s) IV Push every 6 hours PRN For aPTT less than 40  heparin   Injectable 4500 Unit(s) IV Push every 6 hours PRN For aPTT between 40 - 57  oxycodone    5 mG/acetaminophen 325 mG 1 Tablet(s) Oral every 6 hours PRN Moderate Pain (4 - 6)    Vital Signs Last 24 Hrs  T(C): 36.1 (02 Mar 2022 08:29), Max: 36.7 (01 Mar 2022 18:31)  T(F): 97 (02 Mar 2022 08:29), Max: 98.1 (01 Mar 2022 18:31)  HR: 80 (02 Mar 2022 08:29) (76 - 105)  BP: 115/76 (02 Mar 2022 08:29) (13/76 - 129/80)  BP(mean): 86 (02 Mar 2022 08:29) (86 - 92)  RR: 17 (02 Mar 2022 08:29) (15 - 18)  SpO2: 97% (02 Mar 2022 08:29) (95% - 100%)    PHYSICAL EXAM:  General: awake alert   Neck: supple , no JVD   Lungs: CTA  bilateral   Cardio: RRR, S1/S2, No murmur  Abdomen: Soft, Nontender, Nondistended; Bowel sounds present  Extremities: No calf tenderness, No pitting edema  left  leg tenderness ,  swelling     tt< from: TTE Echo Limited or F/U (03.01.22 @ 19:49) >  Summary:   1. Technically adequate study.   2. Endocardial visualization was enhanced with intravenous echo contrast.   3. Normal global left ventricular systolic function.   4. Left ventricular ejection fraction, by visual estimation, is 60 to   65%.   5. Right ventricular volume and pressure overload.   6. Moderately enlarged right ventricle.   7. Moderately-severely reduced RV function.   8. There is no evidence of pericardial effusion.   9. RV is seen on limited views. There is no significant change compare   to a TTE from 2/28/22.    MD Victor Hugo Electronically signed on 3/2/2022 at 9:09:34 AM      < end of copied text >                            12.0   7.12  )-----------( 219      ( 02 Mar 2022 07:02 )             35.3   03-02    140  |  104  |  18.2  ----------------------------<  111<H>  4.2   |  22.0  |  0.99    Ca    8.9      02 Mar 2022 07:02    TPro  6.8  /  Alb  4.0  /  TBili  0.5  /  DBili  x   /  AST  18  /  ALT  16  /  AlkPhos  107  03-01

## 2022-03-02 NOTE — DISCHARGE NOTE PROVIDER - NSDCCPCAREPLAN_GEN_ALL_CORE_FT
PRINCIPAL DISCHARGE DIAGNOSIS  Diagnosis: Acute pulmonary embolism with acute cor pulmonale, unspecified pulmonary embolism type  Assessment and Plan of Treatment: continue eliquis , see cardiologist in 2 weeks for repeat echo   hypercoagulopathy testing ordered      SECONDARY DISCHARGE DIAGNOSES  Diagnosis: Left leg DVT  Assessment and Plan of Treatment:

## 2022-03-02 NOTE — DISCHARGE NOTE PROVIDER - HOSPITAL COURSE
62 y/o M with a PMHx of DVT, HLD, GERD, and right rotator cuff surgery 2 weeks ago who presented to [facility] with new left calf swelling and shortness of breath found to have bilateral PE with suspicion of right sided RV strain. Trop 1055  bnp 345  Patient underwent Mechanical thrombectomy of bilateral PE with good amount of thrombus.    Repeat echo done , no change , pt is on  RA 02 sat 96%, not hypoxic , blood pressure is stable   cardiology follow up noted   stable to discharge and follow up in the office with cardiology in 2 weeks     total time spen coordinating care 36 min      62 y/o M with a PMHx of DVT, HLD, GERD, and right rotator cuff surgery 2 weeks ago who presented to  with new left calf swelling and shortness of breath found to have bilateral PE with suspicion of right sided RV strain. Trop 1055  bnp 345  Patient underwent Mechanical thrombectomy of bilateral PE with good amount of thrombus.    Repeat echo done , no change , pt is on  RA 02 sat 96%, not hypoxic , blood pressure is stable   cardiology follow up noted . Pt had an episode of chest pain nausea dizziness on ambulation 3/2 , repeated troponin ECHO , improved from the previous Echo p  pt is feeling weell today no SOB on room air ambulated   d/w Dr Deshpande , stable discharged home to follow up with DR Deshpande in 2 weeks   =    total time spen coordinating care 36 min

## 2022-03-02 NOTE — PROGRESS NOTE ADULT - PROBLEM SELECTOR PLAN 2
Low cholesterol diet   Lipitor qd
Goal Non-HDL < 130, LDL < 100  Lipid Panel: Controlled at this time  Statin: Continue Lipitor 20 mg daily

## 2022-03-03 ENCOUNTER — TRANSCRIPTION ENCOUNTER (OUTPATIENT)
Age: 62
End: 2022-03-03

## 2022-03-03 VITALS
HEART RATE: 98 BPM | OXYGEN SATURATION: 96 % | DIASTOLIC BLOOD PRESSURE: 79 MMHG | SYSTOLIC BLOOD PRESSURE: 113 MMHG | RESPIRATION RATE: 18 BRPM | TEMPERATURE: 98 F

## 2022-03-03 LAB
APCR PPP: 1.8 RATIO — LOW
AT III ACT/NOR PPP CHRO: 105 % — SIGNIFICANT CHANGE UP (ref 85–135)
B2 GLYCOPROT1 AB SER QL: NEGATIVE — SIGNIFICANT CHANGE UP
CARDIOLIPIN AB SER-ACNC: NEGATIVE — SIGNIFICANT CHANGE UP
DRVVT 50/50: 50.7 SEC — SIGNIFICANT CHANGE UP
DRVVT SCREEN TO CONFIRM RATIO: SIGNIFICANT CHANGE UP
HCT VFR BLD CALC: 35.6 % — LOW (ref 39–50)
HGB BLD-MCNC: 11.8 G/DL — LOW (ref 13–17)
LA NT DPL PPP QL: 62.6 SEC — SIGNIFICANT CHANGE UP
MCHC RBC-ENTMCNC: 30.1 PG — SIGNIFICANT CHANGE UP (ref 27–34)
MCHC RBC-ENTMCNC: 33.1 GM/DL — SIGNIFICANT CHANGE UP (ref 32–36)
MCV RBC AUTO: 90.8 FL — SIGNIFICANT CHANGE UP (ref 80–100)
PLATELET # BLD AUTO: 223 K/UL — SIGNIFICANT CHANGE UP (ref 150–400)
PROT C ACT/NOR PPP: 116 % — SIGNIFICANT CHANGE UP (ref 74–150)
RBC # BLD: 3.92 M/UL — LOW (ref 4.2–5.8)
RBC # FLD: 11.6 % — SIGNIFICANT CHANGE UP (ref 10.3–14.5)
TROPONIN T SERPL-MCNC: 0.02 NG/ML — SIGNIFICANT CHANGE UP (ref 0–0.06)
WBC # BLD: 7.4 K/UL — SIGNIFICANT CHANGE UP (ref 3.8–10.5)
WBC # FLD AUTO: 7.4 K/UL — SIGNIFICANT CHANGE UP (ref 3.8–10.5)

## 2022-03-03 PROCEDURE — 86147 CARDIOLIPIN ANTIBODY EA IG: CPT

## 2022-03-03 PROCEDURE — 85306 CLOT INHIBIT PROT S FREE: CPT

## 2022-03-03 PROCEDURE — 99152 MOD SED SAME PHYS/QHP 5/>YRS: CPT

## 2022-03-03 PROCEDURE — 75743 ARTERY X-RAYS LUNGS: CPT | Mod: 59

## 2022-03-03 PROCEDURE — 83036 HEMOGLOBIN GLYCOSYLATED A1C: CPT

## 2022-03-03 PROCEDURE — 83090 ASSAY OF HOMOCYSTEINE: CPT

## 2022-03-03 PROCEDURE — 80048 BASIC METABOLIC PNL TOTAL CA: CPT

## 2022-03-03 PROCEDURE — C1889: CPT

## 2022-03-03 PROCEDURE — 83605 ASSAY OF LACTIC ACID: CPT

## 2022-03-03 PROCEDURE — 80053 COMPREHEN METABOLIC PANEL: CPT

## 2022-03-03 PROCEDURE — 99153 MOD SED SAME PHYS/QHP EA: CPT

## 2022-03-03 PROCEDURE — C1887: CPT

## 2022-03-03 PROCEDURE — 85300 ANTITHROMBIN III ACTIVITY: CPT

## 2022-03-03 PROCEDURE — 85730 THROMBOPLASTIN TIME PARTIAL: CPT

## 2022-03-03 PROCEDURE — 85027 COMPLETE CBC AUTOMATED: CPT

## 2022-03-03 PROCEDURE — 84484 ASSAY OF TROPONIN QUANT: CPT

## 2022-03-03 PROCEDURE — 93306 TTE W/DOPPLER COMPLETE: CPT

## 2022-03-03 PROCEDURE — 93308 TTE F-UP OR LMTD: CPT

## 2022-03-03 PROCEDURE — C1769: CPT

## 2022-03-03 PROCEDURE — 93970 EXTREMITY STUDY: CPT

## 2022-03-03 PROCEDURE — C1894: CPT

## 2022-03-03 PROCEDURE — 86803 HEPATITIS C AB TEST: CPT

## 2022-03-03 PROCEDURE — 87637 SARSCOV2&INF A&B&RSV AMP PRB: CPT

## 2022-03-03 PROCEDURE — 83880 ASSAY OF NATRIURETIC PEPTIDE: CPT

## 2022-03-03 PROCEDURE — 37184 PRIM ART M-THRMBC 1ST VSL: CPT | Mod: 50

## 2022-03-03 PROCEDURE — 85025 COMPLETE CBC W/AUTO DIFF WBC: CPT

## 2022-03-03 PROCEDURE — 85613 RUSSELL VIPER VENOM DILUTED: CPT

## 2022-03-03 PROCEDURE — 36415 COLL VENOUS BLD VENIPUNCTURE: CPT

## 2022-03-03 PROCEDURE — 86146 BETA-2 GLYCOPROTEIN ANTIBODY: CPT

## 2022-03-03 PROCEDURE — 85610 PROTHROMBIN TIME: CPT

## 2022-03-03 PROCEDURE — 86850 RBC ANTIBODY SCREEN: CPT

## 2022-03-03 PROCEDURE — 85303 CLOT INHIBIT PROT C ACTIVITY: CPT

## 2022-03-03 PROCEDURE — 85307 ASSAY ACTIVATED PROTEIN C: CPT

## 2022-03-03 PROCEDURE — 99239 HOSP IP/OBS DSCHRG MGMT >30: CPT

## 2022-03-03 PROCEDURE — 86901 BLOOD TYPING SEROLOGIC RH(D): CPT

## 2022-03-03 PROCEDURE — C1757: CPT

## 2022-03-03 PROCEDURE — 36014 PLACE CATHETER IN ARTERY: CPT | Mod: 59

## 2022-03-03 PROCEDURE — 93005 ELECTROCARDIOGRAM TRACING: CPT

## 2022-03-03 PROCEDURE — 93308 TTE F-UP OR LMTD: CPT | Mod: 26

## 2022-03-03 PROCEDURE — 80061 LIPID PANEL: CPT

## 2022-03-03 PROCEDURE — 86900 BLOOD TYPING SEROLOGIC ABO: CPT

## 2022-03-03 RX ORDER — OMEPRAZOLE 10 MG/1
1 CAPSULE, DELAYED RELEASE ORAL
Qty: 0 | Refills: 0 | DISCHARGE

## 2022-03-03 RX ORDER — PANTOPRAZOLE SODIUM 20 MG/1
1 TABLET, DELAYED RELEASE ORAL
Qty: 30 | Refills: 0
Start: 2022-03-03 | End: 2022-04-01

## 2022-03-03 RX ADMIN — SERTRALINE 50 MILLIGRAM(S): 25 TABLET, FILM COATED ORAL at 11:46

## 2022-03-03 RX ADMIN — PANTOPRAZOLE SODIUM 40 MILLIGRAM(S): 20 TABLET, DELAYED RELEASE ORAL at 05:22

## 2022-03-03 RX ADMIN — APIXABAN 10 MILLIGRAM(S): 2.5 TABLET, FILM COATED ORAL at 09:05

## 2022-03-03 NOTE — PROGRESS NOTE ADULT - SUBJECTIVE AND OBJECTIVE BOX
CC : SOB , chest pain     Pt is seen earlier today   feeling better , no CP , no nausea       MEDICATIONS  (STANDING):  apixaban 10 milliGRAM(s) Oral every 12 hours  atorvastatin 20 milliGRAM(s) Oral at bedtime  influenza   Vaccine 0.5 milliLiter(s) IntraMuscular once  pantoprazole    Tablet 40 milliGRAM(s) Oral before breakfast  sertraline 50 milliGRAM(s) Oral daily      Vital Signs Last 24 Hrs  T(C): 36.4 (03 Mar 2022 09:12), Max: 36.7 (02 Mar 2022 15:45)  T(F): 97.5 (03 Mar 2022 09:12), Max: 98.1 (02 Mar 2022 15:45)  HR: 88 (03 Mar 2022 09:12) (88 - 109)  BP: 119/80 (03 Mar 2022 09:12) (116/72 - 124/75)  BP(mean): --  RR: 18 (03 Mar 2022 10:52) (18 - 18)  SpO2: 95% (03 Mar 2022 10:52) (92% - 100%)      PHYSICAL EXAM:  General: awake alert , sitting in the bed   Neck: supple , no JVD   Lungs: CTA  bilateral   Cardio: RRR, S1/S2, No murmur  Abdomen: Soft, Nontender, Nondistended; Bowel sounds present  Extremities: No calf tenderness, No pitting edema  left  leg tenderness ,  swelling     TTE Echo Limited or F/U (03.01.22 @ 19:49) >  Summary:   1. Technically adequate study.   2. Endocardial visualization was enhanced with intravenous echo contrast.   3. Normal global left ventricular systolic function.   4. Left ventricular ejection fraction, by visual estimation, is 60 to   65%.   5. Right ventricular volume and pressure overload.   6. Moderately enlarged right ventricle.   7. Moderately-severely reduced RV function.   8. There is no evidence of pericardial effusion.   9. RV is seen on limited views. There is no significant change compare   to a TTE from 2/28/22.    MD Victor Hugo Electronically signed on 3/2/2022 at 9:09:34 AM      < end of copied text >                                     11.8   7.40  )-----------( 223      ( 03 Mar 2022 06:47 )             35.6   03-02    140  |  104  |  18.2  ----------------------------<  111<H>  4.2   |  22.0  |  0.99    Ca    8.9      02 Mar 2022 07:02

## 2022-03-03 NOTE — DISCHARGE NOTE NURSING/CASE MANAGEMENT/SOCIAL WORK - PATIENT PORTAL LINK FT
You can access the FollowMyHealth Patient Portal offered by [facility] by registering at the following website: [email or web address]. By joining [facility]’s FollowMyHealth portal, you will also be able to view your health information using other applications (apps) compatible with our system.

## 2022-03-03 NOTE — DISCHARGE NOTE NURSING/CASE MANAGEMENT/SOCIAL WORK - NSDCPEFALRISK_GEN_ALL_CORE
For information on Fall & Injury Prevention, visit: [email or web address] OR  [email or web address] OR  https://www.cdc.gov/steadi/patient.html

## 2022-03-03 NOTE — PROGRESS NOTE ADULT - ASSESSMENT
60 y/o M with a PMHx of DVT (Dx 5 years ago, finished course of AC), HLD, GERD, and right rotator cuff surgery 2 weeks ago who presented to [facility] with new left calf swelling and shortness of breath. Patient states that after his surgery he has been taking recurrent oxycodone and sleeping a lot in his recliner. Patient states that starting on Friday he noted that his left leg was becoming swollen, and earlier last week his wife thought he looked a little winded. Patient states that then starting last night he began experiencing severe shortness of breath and diaphoresis which rendered him unable to sleep overnight. Patient presented to [place] where he was found to have an elevated D-Dimer, and CTA showed extensive bilateral pulmonary embolisms with evidence of RV strain. Patient also with elevated troponin and BNP, and he was transferred to [facility] for further evaluation. Patient states that he had a PE previously a few years ago after he hit his leg, and then flew to [place]. Patient states he was on AC short term, but never had a hypercoagulable workup.     1-Bilateral pulmonary embolism   ·S/P mechanical thrombectomy.  on po eliquis   on room air 96-97 %   repeat TTE this am   had an episode of CP . dizziness yesterday   d/w cardiology     2- s/p right shoulder surgery   cont tylenol for pain     3-h/o DVT with LLE DVT on US   already on anticoagulation     follow up hyper coagulopathy work up  outpatient     d/w pt    discharge home pending repeat TTE   
62 y/o M with a PMHx of DVT (Dx 5 years ago, finished course of AC), HLD, GERD, and right rotator cuff surgery 2 weeks ago who presented to [facility] with new left calf swelling and shortness of breath. Patient states that after his surgery he has been taking recurrent oxycodone and sleeping a lot in his recliner. Patient states that starting on Friday he noted that his left leg was becoming swollen, and earlier last week his wife thought he looked a little winded. Patient states that then starting last night he began experiencing severe shortness of breath and diaphoresis which rendered him unable to sleep overnight. Patient presented to [place] where he was found to have an elevated D-Dimer, and CTA showed extensive bilateral pulmonary embolisms with evidence of RV strain. Patient also with elevated troponin and BNP, and he was transferred to [facility] for further evaluation. Patient states that he had a PE previously a few years ago after he hit his leg, and then flew to [place]. Patient states he was on AC short term, but never had a hypercoagulable workup.     1-Bilateral pulmonary embolism   ·S/P mechanical thrombectomy.on po eliquis   off heparin   on room air 96-97 %   repeat TTE noted   d/w cardiology   new CP with nausea tachycardic   ECG , troponin   cont to monitor   zofran for nausea , add maalox   cardiology informed to follow up   ordered   LLE venous doppler. P     2- s/p right shoulder surgery   cont tylenol for pain     3-h/o DVT with LLE DVT on US   already on anticoagulation     follow up hypercoagulopathy work up      d/w pt , wife and PA at the bedside   
62 y/o M with a PMHx of DVT (Dx 5 years ago, finished course of AC), HLD, GERD, and right rotator cuff surgery 2 weeks ago who presented to [facility] with new left calf swelling and shortness of breath. Patient states that after his surgery he has been taking recurrent oxycodone and sleeping a lot in his recliner. Patient states that starting on Friday he noted that his left leg was becoming swollen, and earlier last week his wife thought he looked a little winded. Patient states that then starting last night he began experiencing severe shortness of breath and diaphoresis which rendered him unable to sleep overnight. Patient presented to [place] where he was found to have an elevated D-Dimer, and CTA showed extensive bilateral pulmonary embolisms with evidence of RV strain. Patient also with elevated troponin and BNP, and he was transferred to [facility] for further evaluation. Patient states that he had a PE previously a few years ago after he hit his leg, and then flew to [place]. Patient states he was on AC short term, but never had a hypercoagulable workup.     1-Bilateral pulmonary embolism.   ·S/P mechanical thrombectomy.  on IV Anticoagulation: Continue heparin drip and D/C with first dose of Eliquis.  Start Eliquis 10 mg BID for 7 days, then 5 mg BID.  Echo: In AM  Would obtain hypercoagulable workup ordered   LLE venous doppler. P     2- s/p right shoulder surgery   cont tylenol for pain     3-h/o DVT   US of lower ext P   
60 y/o M with a PMHx of DVT, HLD, GERD, and right rotator cuff surgery 2 weeks ago who presented to [facility] with new left calf swelling and shortness of breath found to have bilateeral PE with suspicion of right sided RV strain. Trop 1055  bnp 345  Pateint underwent Mechanical thrombectomy of bilateral PE with good amount of thrombus.    Repeat echo pending  RA 02 sat 96%        
60 y/o M with a PMHx of DVT (Dx 5 years ago, finished course of AC), HLD, GERD, and right rotator cuff surgery 2 weeks ago who presented to [facility] with new left calf swelling and shortness of breath. Patient states that after his surgery he has been taking recurrent oxycodone and sleeping a lot in his recliner. Patient states that starting on Friday he noted that his left leg was becoming swollen, and earlier last week his wife thought he looked a little winded. Patient states that then starting last night he began experiencing severe shortness of breath and diaphoresis which rendered him unable to sleep overnight. Patient presented to [place] where he was found to have an elevated D-Dimer, and CTA showed extensive bilateral pulmonary embolisms with evidence of RV strain. Patient also with elevated troponin and BNP, and he was transferred to [facility] for further evaluation. Patient states that he had a PE previously a few years ago after he hit his leg, and [place] Patient states he was on AC short term, but never had a hypercoagulable workup. Patient denies any fevers, chills, CP, syncope, near syncope, abdominal pain, N/V/D, headache, or dizziness. He had mechanical thrombectomy ([place]) of the B/L pulmonary embolism for good amount of thrombus. He states improvement in SOB, denies chest pain.

## 2022-03-04 LAB — PROT S FREE PPP-ACNC: 88 % — SIGNIFICANT CHANGE UP (ref 63–140)

## 2022-03-08 NOTE — CDI QUERY NOTE - NSCDIOTHERTXTBX_GEN_ALL_CORE_HH
Patient had rotator cuff surgery 2 weeks prior to admission, can you please clarify if pulmonary embolism is a complication or not post-surgery?  A.	Pulmonary embolism (PE) likely a complication postsurgery  B.	Pulmonary embolism (PE) not related to surgery  C.	Other, please specify  D.	Not clinically significant      Supporting documentation:      &P Adult [Charted Location: [facility] ER 1606 [id number]] [Authored: 28-Feb-2022 21:46]  History of Present Illness:   62 yo male with PMH of provoked DVT (5 years ago completed AC course), HLD, GERD, right rotator cuff surgery 2 week ago presented initially to the [facility] earier today for new left calf swelling and worsening dyspnea. Patient states that after his surgery he has been taking recurrent oxycodone and sleeping a lot in his recliner. Patient states that starting on Friday he noted that his left leg was becoming swollen, and earlier last week his wife thought he looked a little winded. Patient states that then starting last night he began experiencing severe shortness of breath and diaphoresis which rendered him unable to sleep overnight. Patient presented to [place] where he was found to have an elevated D-Dimer, and CTA showed extensive bilateral pulmonary embolisms with evidence of RV strain.      Discharge Note Provider [Charted Location: [facility] 6TWR [id number] [id number]] [Authored: 02-Mar-2022 11:53]  PRINCIPAL DISCHARGE DIAGNOSIS  Diagnosis: Acute pulmonary embolism with acute cor pulmonale, unspecified pulmonary embolism type  Assessment and Plan of Treatment: continue eliquis , see cardiologist in 2 weeks for repeat echo   hypercoagulopathy testing ordered

## 2022-03-16 ENCOUNTER — APPOINTMENT (OUTPATIENT)
Dept: CARDIOLOGY | Facility: CLINIC | Age: 62
End: 2022-03-16
Payer: COMMERCIAL

## 2022-03-16 ENCOUNTER — NON-APPOINTMENT (OUTPATIENT)
Age: 62
End: 2022-03-16

## 2022-03-16 VITALS
HEART RATE: 86 BPM | DIASTOLIC BLOOD PRESSURE: 70 MMHG | TEMPERATURE: 98.9 F | SYSTOLIC BLOOD PRESSURE: 115 MMHG | WEIGHT: 230 LBS | OXYGEN SATURATION: 96 % | HEIGHT: 69 IN | BODY MASS INDEX: 34.07 KG/M2

## 2022-03-16 PROCEDURE — 93000 ELECTROCARDIOGRAM COMPLETE: CPT

## 2022-03-16 PROCEDURE — 99215 OFFICE O/P EST HI 40 MIN: CPT

## 2022-03-16 RX ORDER — OMEPRAZOLE 20 MG/1
20 TABLET, DELAYED RELEASE ORAL
Refills: 0 | Status: DISCONTINUED | COMMUNITY
Start: 2021-03-12 | End: 2022-03-16

## 2022-04-12 ENCOUNTER — RX RENEWAL (OUTPATIENT)
Age: 62
End: 2022-04-12

## 2022-04-15 ENCOUNTER — APPOINTMENT (OUTPATIENT)
Dept: PULMONOLOGY | Facility: CLINIC | Age: 62
End: 2022-04-15
Payer: COMMERCIAL

## 2022-04-15 ENCOUNTER — LABORATORY RESULT (OUTPATIENT)
Age: 62
End: 2022-04-15

## 2022-04-15 VITALS
OXYGEN SATURATION: 96 % | DIASTOLIC BLOOD PRESSURE: 77 MMHG | HEART RATE: 77 BPM | SYSTOLIC BLOOD PRESSURE: 113 MMHG | HEIGHT: 69 IN | BODY MASS INDEX: 36.34 KG/M2 | WEIGHT: 245.38 LBS | TEMPERATURE: 98.8 F

## 2022-04-15 DIAGNOSIS — R79.89 OTHER SPECIFIED ABNORMAL FINDINGS OF BLOOD CHEMISTRY: ICD-10-CM

## 2022-04-15 DIAGNOSIS — E66.3 OVERWEIGHT: ICD-10-CM

## 2022-04-15 DIAGNOSIS — Z00.00 ENCOUNTER FOR GENERAL ADULT MEDICAL EXAMINATION W/OUT ABNORMAL FINDINGS: ICD-10-CM

## 2022-04-15 LAB
ALBUMIN: 10
BILIRUB UR QL STRIP: NORMAL
CLARITY UR: CLEAR
COLLECTION METHOD: NORMAL
CREATININE: 200
GLUCOSE UR-MCNC: NORMAL
HCG UR QL: 0.2 EU/DL
HGB UR QL STRIP.AUTO: NORMAL
KETONES UR-MCNC: NORMAL
LEUKOCYTE ESTERASE UR QL STRIP: NORMAL
MICROALBUMIN/CREAT UR TEST STR-RTO: <30
NITRITE UR QL STRIP: NORMAL
PH UR STRIP: 5
PROT UR STRIP-MCNC: NORMAL
SP GR UR STRIP: 1.02

## 2022-04-15 PROCEDURE — 99396 PREV VISIT EST AGE 40-64: CPT | Mod: 25

## 2022-04-15 PROCEDURE — 81003 URINALYSIS AUTO W/O SCOPE: CPT | Mod: NC,QW

## 2022-04-15 PROCEDURE — 82044 UR ALBUMIN SEMIQUANTITATIVE: CPT | Mod: QW

## 2022-04-15 RX ORDER — PANTOPRAZOLE 40 MG/1
40 TABLET, DELAYED RELEASE ORAL DAILY
Qty: 30 | Refills: 0 | Status: DISCONTINUED | COMMUNITY
Start: 2022-03-16 | End: 2022-04-15

## 2022-04-17 NOTE — DISCUSSION/SUMMARY
[FreeTextEntry1] : Preop for cataract left April 25 th followed right on May 9th\par [facility] by history compliant to CPAP\par Hyperlipidemia on therapy.\par Overweight.\par Status post PE with large clot burden.  Repeat echo without RV overload or dysfunction.\par DVT.  Recurrent.\par

## 2022-04-17 NOTE — HISTORY OF PRESENT ILLNESS
[TextBox_4] : After shoulder replacement surgery Feb 10 2022,post surgery 10 days later left leg swollen, went to ER with sob\par was hospital for 5 days [facility] for bilateral PEs treated with catheter thrombectomy.\par now on Eliquis\par was suppose to see hematologist ,lost the number\par shoulder OK\par \par Colonoscopy 2019\par Optho y going for surgery, needs medical clearance April 25 Th Monday cataract Sx right followed by left Dr. Ann Cox [phone]\par Derm Y\par \par [facility] compliant to CPAP.due for new machine DEC 2023\par \par Prior DVT associated with trauma and then travel. \par Had recc. of DVT saw vascular. Off anticoagulation for many years. \par \par \par

## 2022-04-17 NOTE — PROCEDURE
[FreeTextEntry1] : unable to download cpap data\par  1 / 1 Benjamín Monique   \par  \par Report date: 3/3/2022 \par  \par  View Order\par \par \par (Report matches study selected on Patient History pane)\par \par \par  \par ACC: [id number] EXAM: ECHO TRANSTHORACIC;F U OR LTD\par \par PROCEDURE DATE: 03/03/2022\par \par \par \par INTERPRETATION: TRANSTHORACIC ECHOCARDIOGRAM REPORT\par \par \par \par Patient Name: ZACKERY LI Patient Location: Inpatient\par Medical Rec #: [id number] Accession #: [id number]\par Account #: Height: 68.9 in 175.0 cm\par YOB: 1960 Weight: 245.6 lb 111.40 kg\par Patient Age: 61 years BSA: 2.25 mï¿½\par Patient Gender: M BP: 118/83 mmHg\par \par \par Date of Exam: 3/3/2022 12:13:17 PM\par Sonographer: Kristi Ann\par Referring Physician: Berkley Delarosa MD\par \par Procedure: Follow up or Limited Echocardiogram.\par Indications: Other pulmonary embolism without acute cor pulmonale - I26.99\par Diagnosis: Other pulmonary embolism without acute cor pulmonale - I26.99\par Study Details: Technically adequate study.\par \par SPECTRAL DOPPLER ANALYSIS (where applicable):\par Tricuspid Valve and PA/RV Systolic Pressure: TR Max Velocity: 2.99 m/s RA Pressure: 3 mmHg RVSP/PASP: 38.8 mmHg\par \par \par PHYSICIAN INTERPRETATION:\par Left Ventricle: The left ventricular internal cavity size is normal.\par Global LV systolic function was normal. Left ventricular ejection fraction, by visual estimation, is 60 to 65%.\par Right Ventricle: Normal right ventricular size and function. The right ventricular size is normal. RV systolic function is normal.\par Left Atrium: The left atrium is normal in size.\par Right Atrium: The right atrium is normal in size.\par Pericardium: Trivial pericardial effusion is present.\par Tricuspid Valve: Trivial tricuspid regurgitation is visualized. Estimated pulmonary artery systolic pressure is 38.8 mmHg assuming a right atrial pressure of 3 mmHg, which is consistent with borderline pulmonary hypertension.\par Aortic Valve: The aortic valve was not well visualized.\par Pulmonary Artery: The main pulmonary artery is normal in size.\par Venous: The inferior vena cava is 1.8 cm. The inferior vena cava was normal sized, with respiratory size variation greater than 50%.\par In comparison to the previous echocardiogram(s): Prior examinations are available and were reviewed for comparison purposes. Compared to the prior TTE study from 3/1/22, interval normalization of RV size/function.\par \par \par Summary:\par  1. Left ventricular ejection fraction, by visual estimation, is 60 to 65%.\par  2. Normal global left ventricular systolic function.\par  3. Normal right ventricular size and function, TAPSE 2.0 cm.\par  4. Estimated pulmonary artery systolic pressure is 38.8 mmHg assuming a right atrial pressure of 3 mmHg, which is consistent with borderline pulmonary hypertension.\par  5. Trivial pericardial effusion posterior to the RA.\par  6. Compared to the prior TTE study from 3/1/22, interval normalization of RV size/function.\par \par Benjamín Peoples DO Electronically signed on 3/3/2022 at 1:26:48 PM\par \par \par

## 2022-04-17 NOTE — ASSESSMENT
[FreeTextEntry1] : Obtain CPAP data.\par Continue present CPAP settings.\par gave amera view mask size large sample\par \par Patient was informed about the recall of [employer] CPAP machines.  Risks and benefits of continuing treatment versus interruption of treatment discussed.  Patient encouraged to go to the  website and to reach out to DME regarding device upgrade and or replacement.\par \par Continue anticoagulation.  Patient aware and by history ophthalmologist aware cannot discontinue anticoagulation.\par \par \par \par Medical problems as above. Medical status maximized. No medical contraindications to surgery.\par Pt with CONNER. Anesthesia should be aware of CONNER and take CONNER precautions.\par

## 2022-04-18 LAB
25(OH)D3 SERPL-MCNC: 25.3 NG/ML
ALBUMIN SERPL ELPH-MCNC: 4.9 G/DL
ALP BLD-CCNC: 91 U/L
ALT SERPL-CCNC: 23 U/L
ANION GAP SERPL CALC-SCNC: 13 MMOL/L
APTT BLD: 32.9 SEC
AST SERPL-CCNC: 17 U/L
BASOPHILS # BLD AUTO: 0.04 K/UL
BASOPHILS NFR BLD AUTO: 0.8 %
BILIRUB SERPL-MCNC: 0.4 MG/DL
BUN SERPL-MCNC: 15 MG/DL
CALCIUM SERPL-MCNC: 9.7 MG/DL
CHLORIDE SERPL-SCNC: 106 MMOL/L
CHOLEST SERPL-MCNC: 139 MG/DL
CO2 SERPL-SCNC: 20 MMOL/L
CREAT SERPL-MCNC: 0.92 MG/DL
EGFR: 95 ML/MIN/1.73M2
EOSINOPHIL # BLD AUTO: 0.11 K/UL
EOSINOPHIL NFR BLD AUTO: 2.1 %
ESTIMATED AVERAGE GLUCOSE: 117 MG/DL
GLUCOSE SERPL-MCNC: 91 MG/DL
HBA1C MFR BLD HPLC: 5.7 %
HCT VFR BLD CALC: 41.7 %
HDLC SERPL-MCNC: 38 MG/DL
HGB BLD-MCNC: 13.4 G/DL
IMM GRANULOCYTES NFR BLD AUTO: 0.2 %
INR PPP: 1.27 RATIO
LDLC SERPL CALC-MCNC: 83 MG/DL
LYMPHOCYTES # BLD AUTO: 1.85 K/UL
LYMPHOCYTES NFR BLD AUTO: 35.6 %
MAN DIFF?: NORMAL
MCHC RBC-ENTMCNC: 30.3 PG
MCHC RBC-ENTMCNC: 32.1 GM/DL
MCV RBC AUTO: 94.3 FL
MONOCYTES # BLD AUTO: 0.45 K/UL
MONOCYTES NFR BLD AUTO: 8.7 %
NEUTROPHILS # BLD AUTO: 2.73 K/UL
NEUTROPHILS NFR BLD AUTO: 52.6 %
NONHDLC SERPL-MCNC: 101 MG/DL
PLATELET # BLD AUTO: 306 K/UL
POTASSIUM SERPL-SCNC: 4.3 MMOL/L
PROT SERPL-MCNC: 7.2 G/DL
PSA SERPL-MCNC: 1.22 NG/ML
PT BLD: 15 SEC
RBC # BLD: 4.42 M/UL
RBC # FLD: 11.9 %
SODIUM SERPL-SCNC: 139 MMOL/L
T3 SERPL-MCNC: 106 NG/DL
T3RU NFR SERPL: 1 TBI
T4 FREE SERPL-MCNC: 1.1 NG/DL
T4 SERPL-MCNC: 5.3 UG/DL
TRIGL SERPL-MCNC: 89 MG/DL
TSH SERPL-ACNC: 0.92 UIU/ML
WBC # FLD AUTO: 5.19 K/UL

## 2022-05-01 ENCOUNTER — EMERGENCY (EMERGENCY)
Facility: HOSPITAL | Age: 62
LOS: 1 days | End: 2022-05-01
Attending: EMERGENCY MEDICINE
Payer: COMMERCIAL

## 2022-05-01 VITALS
DIASTOLIC BLOOD PRESSURE: 85 MMHG | WEIGHT: 250 LBS | SYSTOLIC BLOOD PRESSURE: 131 MMHG | HEIGHT: 69 IN | HEART RATE: 89 BPM | OXYGEN SATURATION: 97 % | RESPIRATION RATE: 16 BRPM | TEMPERATURE: 99 F

## 2022-05-01 DIAGNOSIS — Z98.890 OTHER SPECIFIED POSTPROCEDURAL STATES: Chronic | ICD-10-CM

## 2022-05-01 PROCEDURE — 99284 EMERGENCY DEPT VISIT MOD MDM: CPT | Mod: 25

## 2022-05-01 PROCEDURE — 70450 CT HEAD/BRAIN W/O DYE: CPT | Mod: 26,MA

## 2022-05-01 PROCEDURE — 99284 EMERGENCY DEPT VISIT MOD MDM: CPT

## 2022-05-01 PROCEDURE — 70450 CT HEAD/BRAIN W/O DYE: CPT | Mod: MA

## 2022-05-01 NOTE — ED ADULT NURSE NOTE - OBJECTIVE STATEMENT
pt ambulatory from triage a&ox4 w/ complaints of fall +hit front of head +Eliquis daily. no open wounds noted. small area of localized edema noted to right side forehead. denies neuro complaints. no headache/dizziness. moving all extremities with equal strength. speech clear and logical. no facial droop/drift noted. states "I feel fine, I just want to get checked out." denies neck/back pain

## 2022-05-01 NOTE — ED PROVIDER NOTE - OBJECTIVE STATEMENT
Pt is a 63 yo male with pmhx of DVT PE on eliquis HLD, GERD here s/p trip and fall in backyard hit head on pavers denies any loc neck pain headache nv dizziness neck pain numbness tingling weakness.

## 2022-05-01 NOTE — ED PROVIDER NOTE - NS ED ATTENDING STATEMENT MOD
This was a shared visit with the SINDHU. I reviewed and verified the documentation and independently performed the documented:

## 2022-05-01 NOTE — ED PROVIDER NOTE - CLINICAL SUMMARY MEDICAL DECISION MAKING FREE TEXT BOX
63 yo white male with trip and fall at home short while ago, hit forehead and now presents to ED for evaluation. No LOC. No neck pain. This case will require evaluation and imaging.

## 2022-05-01 NOTE — ED PROVIDER NOTE - NSFOLLOWUPINSTRUCTIONS_ED_ALL_ED_FT
Follow up with pcp  return to er for any worsening symptoms          HEAD INJURY - Discharge Care           Head Injury    WHAT YOU NEED TO KNOW:    A head injury can include your scalp, face, skull, or brain and range from mild to severe. Effects can appear immediately after the injury or develop later. The effects may last a short time or be permanent. Healthcare providers may want to check your recovery over time. Treatment may change as you recover or develop new health problems from the head injury.    DISCHARGE INSTRUCTIONS:    Call your local emergency number (911 in the ), or have someone else call if:   •You cannot be woken.      •You have a seizure.      •You stop responding to others or you faint.      •You have blurry or double vision.      •Your speech becomes slurred or confused.      •You have arm or leg weakness, loss of feeling, or new problems with coordination.      •Your pupils are larger than usual, or one pupil is a different size than the other.      •You have blood or clear fluid coming out of your ears or nose.      Seek care immediately if:   •You have repeated or forceful vomiting.      •You feel confused.      •Your headache gets worse or becomes severe.      •You or someone caring for you notices that you are harder to wake than usual.      Call your doctor if:   •Your symptoms last longer than 6 weeks after the injury.      •You have questions or concerns about your condition or care.      Medicines:   •Acetaminophen decreases pain and fever. It is available without a doctor's order. Ask how much to take and how often to take it. Follow directions. Read the labels of all other medicines you are using to see if they also contain acetaminophen, or ask your doctor or pharmacist. Acetaminophen can cause liver damage if not taken correctly.       •Take your medicine as directed. Contact your healthcare provider if you think your medicine is not helping or if you have side effects. Tell him or her if you are allergic to any medicine. Keep a list of the medicines, vitamins, and herbs you take. Include the amounts, and when and why you take them. Bring the list or the pill bottles to follow-up visits. Carry your medicine list with you in case of an emergency.      Self-care:   •Rest or do quiet activities. Limit your time watching TV, using the computer, or doing tasks that require a lot of thinking. Slowly return to your normal activities as directed. Do not play sports or do activities that may cause you to get hit in the head. Ask your healthcare provider when you can return to sports.      •Apply ice on your head for 15 to 20 minutes every hour or as directed. Use an ice pack, or put crushed ice in a plastic bag. Cover it with a towel before you apply it to your skin. Ice helps prevent tissue damage and decreases swelling and pain.      •Have someone stay with you for 24 hours , or as directed. This person can monitor you for problems and call for help if needed. When you are awake, the person should ask you a few questions every few hours to see if you are thinking clearly. An example is to ask your name or address.      Prevent another head injury:   •Wear a helmet that fits properly. Do this when you play sports, or ride a bike, scooter, or skateboard. Helmets help decrease your risk for a serious head injury. Talk to your healthcare provider about other ways you can protect yourself if you play sports.      •Wear your seatbelt every time you are in a car. This helps lower your risk for a head injury if you are in a car accident.      Follow up with your doctor as directed: Write down your questions so you remember to ask them during your visits.       © Copyright [employer] 2022           back to top                          © Copyright [employer] 2022

## 2022-05-01 NOTE — ED PROVIDER NOTE - ATTENDING APP SHARED VISIT CONTRIBUTION OF CARE
Exam revealed white male in NAD with minimal soft tissue swelling superior aspect of forehead. I agree with plan and management outlined by PA.

## 2022-05-01 NOTE — ED PROVIDER NOTE - CARE PROVIDER_API CALL
Glenn Lama  INTERNAL MEDICINE  [address], [address]  [place] [address]  Phone: [phone]  Fax: [phone]  Follow Up Time:

## 2022-05-01 NOTE — ED PROVIDER NOTE - CONSTITUTIONAL, MLM
normal... Well appearing, awake, alert, oriented to person, place, time/situation and in no apparent distress. nc/at no laceration no midline spinal ttp

## 2022-05-01 NOTE — ED PROVIDER NOTE - PATIENT PORTAL LINK FT
You can access the FollowMyHealth Patient Portal offered by [facility] by registering at the following website: [email or web address]. By joining [employer]’s FollowMyHealth portal, you will also be able to view your health information using other applications (apps) compatible with our system.

## 2022-05-01 NOTE — ED ADULT NURSE NOTE - NSIMPLEMENTINTERV_GEN_ALL_ED
Implemented All Fall with Harm Risk Interventions:  Zachary to call system. Call bell, personal items and telephone within reach. Instruct patient to call for assistance. Room bathroom lighting operational. Non-slip footwear when patient is off stretcher. Physically safe environment: no spills, clutter or unnecessary equipment. Stretcher in lowest position, wheels locked, appropriate side rails in place. Provide visual cue, wrist band, yellow gown, etc. Monitor gait and stability. Monitor for mental status changes and reorient to person, place, and time. Review medications for side effects contributing to fall risk. Reinforce activity limits and safety measures with patient and family. Provide visual clues: red socks.

## 2022-06-12 ENCOUNTER — RX RENEWAL (OUTPATIENT)
Age: 62
End: 2022-06-12

## 2022-06-22 ENCOUNTER — APPOINTMENT (OUTPATIENT)
Dept: CARDIOLOGY | Facility: CLINIC | Age: 62
End: 2022-06-22

## 2022-09-13 ENCOUNTER — RX RENEWAL (OUTPATIENT)
Age: 62
End: 2022-09-13

## 2022-12-22 ENCOUNTER — RX RENEWAL (OUTPATIENT)
Age: 62
End: 2022-12-22

## 2023-04-24 ENCOUNTER — APPOINTMENT (OUTPATIENT)
Dept: CARDIOLOGY | Facility: CLINIC | Age: 63
End: 2023-04-24
Payer: COMMERCIAL

## 2023-04-24 ENCOUNTER — NON-APPOINTMENT (OUTPATIENT)
Age: 63
End: 2023-04-24

## 2023-04-24 VITALS
DIASTOLIC BLOOD PRESSURE: 74 MMHG | OXYGEN SATURATION: 95 % | BODY MASS INDEX: 35.29 KG/M2 | WEIGHT: 239 LBS | TEMPERATURE: 98 F | SYSTOLIC BLOOD PRESSURE: 115 MMHG | HEART RATE: 74 BPM

## 2023-04-24 DIAGNOSIS — I26.99 OTHER PULMONARY EMBOLISM W/OUT ACUTE COR PULMONALE: ICD-10-CM

## 2023-04-24 DIAGNOSIS — I11.9 HYPERTENSIVE HEART DISEASE W/OUT HEART FAILURE: ICD-10-CM

## 2023-04-24 DIAGNOSIS — E78.00 PURE HYPERCHOLESTEROLEMIA, UNSPECIFIED: ICD-10-CM

## 2023-04-24 PROCEDURE — 93000 ELECTROCARDIOGRAM COMPLETE: CPT

## 2023-04-24 PROCEDURE — 99215 OFFICE O/P EST HI 40 MIN: CPT | Mod: 25

## 2023-04-24 RX ORDER — APIXABAN 5 MG/1
5 TABLET, FILM COATED ORAL
Qty: 180 | Refills: 3 | Status: ACTIVE | COMMUNITY
Start: 2022-03-16 | End: 1900-01-01

## 2023-05-30 ENCOUNTER — RX RENEWAL (OUTPATIENT)
Age: 63
End: 2023-05-30

## 2023-06-30 ENCOUNTER — RX RENEWAL (OUTPATIENT)
Age: 63
End: 2023-06-30

## 2023-07-05 NOTE — ASU PREOP CHECKLIST - BP NONINVASIVE SYSTOLIC (MM HG)
Are you able to work in pt today?  They do have an appt for tomorrow.    Appointments in Next Year    Jul 06, 2023 10:00 AM  (Arrive by 9:40 AM)  Provider Visit with DAVID Martinez CNP  [facility] ([facility] ) [phone]            See [employer] message.    Bhavna Andino RN  [facility] ~ Registered Nurse  Clinic Triage ~ [place] [facility]  July 5, 2023    
Can offer him my 5:30 slot, have family come at 5 pm.     Electronically signed by Jose Paige MD    
I  unfortunately am not able to work in today. If unable to get in today, UC today or appt tomorrow would still be appropriate. I could see at same day/sheela spot tomorrow if needing appt.   Thanks  Damari Bone MD    
Spoke with Tami and let her know Dr Paige can see Samson today at 5:30 but to arrive at 5p in [place].    Closing encounter.    Bhavna Andion RN  [facility] ~ Registered Nurse  Clinic Triage ~ [place] [facility]  July 5, 2023    
They were not able to get here in time today for work in. Can Dr. Paige or Dr. Bone work in later today for ear infection?    Pavithra Acuna, Pediatric Nurse Practitioner   [facility] [place]    
120

## 2023-08-04 NOTE — PHYSICAL EXAM
[General Appearance - Well Developed] : well developed [General Appearance - Well Nourished] : well nourished [Jugular Venous Distention Increased] : there was no jugular-venous distention [Heart Sounds] : normal S1 and S2 [Edema] : no peripheral edema present [Auscultation Breath Sounds / Voice Sounds] : lungs were clear to auscultation bilaterally [Abdomen Soft] : soft [Abdomen Tenderness] : non-tender [] : no hepato-splenomegaly [Nail Clubbing] : no clubbing of the fingernails [Cyanosis, Localized] : no localized cyanosis [id number]

## 2023-08-25 ENCOUNTER — RX RENEWAL (OUTPATIENT)
Age: 63
End: 2023-08-25

## 2023-12-07 NOTE — ED ADULT TRIAGE NOTE - BSA (M2)
RN called RT to inform pt was on his home cpap unit and desating. Placed O2 in line with home unit, didn't work. Placed pt on hosp bipap at this time, SpO2 improving.   Pt SpO2 now 97% on 45%FiO2 2.27

## 2024-02-23 ENCOUNTER — RX RENEWAL (OUTPATIENT)
Age: 64
End: 2024-02-23

## 2024-03-11 ENCOUNTER — RX RENEWAL (OUTPATIENT)
Age: 64
End: 2024-03-11

## 2024-03-11 RX ORDER — SERTRALINE HYDROCHLORIDE 50 MG/1
50 TABLET, FILM COATED ORAL
Qty: 90 | Refills: 1 | Status: ACTIVE | COMMUNITY
Start: 2017-04-07 | End: 1900-01-01

## 2024-03-29 ENCOUNTER — APPOINTMENT (OUTPATIENT)
Dept: PULMONOLOGY | Facility: CLINIC | Age: 64
End: 2024-03-29
Payer: COMMERCIAL

## 2024-03-29 VITALS — SYSTOLIC BLOOD PRESSURE: 143 MMHG | HEART RATE: 82 BPM | OXYGEN SATURATION: 95 % | DIASTOLIC BLOOD PRESSURE: 89 MMHG

## 2024-03-29 DIAGNOSIS — G47.33 OBSTRUCTIVE SLEEP APNEA (ADULT) (PEDIATRIC): ICD-10-CM

## 2024-03-29 DIAGNOSIS — I82.409 ACUTE EMBOLISM AND THROMBOSIS OF UNSPECIFIED DEEP VEINS OF UNSPECIFIED LOWER EXTREMITY: ICD-10-CM

## 2024-03-29 PROCEDURE — 99214 OFFICE O/P EST MOD 30 MIN: CPT

## 2024-03-29 NOTE — PHYSICAL EXAM
[No Acute Distress] : no acute distress [Supple] : supple [No JVD] : no jvd [No Murmurs] : no murmurs [Normal S1, S2] : normal s1, s2 [Clear to Auscultation Bilaterally] : clear to auscultation bilaterally [Normal to Percussion] : normal to percussion [Not Tender] : not tender [Benign] : benign [No HSM] : no hsm [No Clubbing] : no clubbing [No Cyanosis] : no cyanosis [No Edema] : no edema [No Focal Deficits] : no focal deficits [Oriented x3] : oriented x3

## 2024-03-30 NOTE — PROCEDURE
[FreeTextEntry1] : unable to download cpap data\par   1 / 1 Benjamín Monique   \par   \par  Report date: 3/3/2022 \par   \par   View Order\par  \par  \par  (Report matches study selected on Patient History pane)\par  \par  \par   \par  ACC: [id number] EXAM: ECHO TRANSTHORACIC;F U OR LTD\par  \par  PROCEDURE DATE: 03/03/2022\par  \par  \par  \par  INTERPRETATION: TRANSTHORACIC ECHOCARDIOGRAM REPORT\par  \par  \par  \par  Patient Name: ZACKERY LI Patient Location: Inpatient\par  Medical Rec #: [id number] Accession #: [id number]\par  Account #: Height: 68.9 in 175.0 cm\par  YOB: 1960 Weight: 245.6 lb 111.40 kg\par  Patient Age: 61 years BSA: 2.25 mi\par  Patient Gender: M BP: 118/83 mmHg\par  \par  \par  Date of Exam: 3/3/2022 12:13:17 PM\par  Sonographer: Kristi Ann\par  Referring Physician: Berkley Delarosa MD\par  \par  Procedure: Follow up or Limited Echocardiogram.\par  Indications: Other pulmonary embolism without acute cor pulmonale - I26.99\par  Diagnosis: Other pulmonary embolism without acute cor pulmonale - I26.99\par  Study Details: Technically adequate study.\par  \par  SPECTRAL DOPPLER ANALYSIS (where applicable):\par  Tricuspid Valve and PA/RV Systolic Pressure: TR Max Velocity: 2.99 m/s RA Pressure: 3 mmHg RVSP/PASP: 38.8 mmHg\par  \par  \par  PHYSICIAN INTERPRETATION:\par  Left Ventricle: The left ventricular internal cavity size is normal.\par  Global LV systolic function was normal. Left ventricular ejection fraction, by visual estimation, is 60 to 65%.\par  Right Ventricle: Normal right ventricular size and function. The right ventricular size is normal. RV systolic function is normal.\par  Left Atrium: The left atrium is normal in size.\par  Right Atrium: The right atrium is normal in size.\par  Pericardium: Trivial pericardial effusion is present.\par  Tricuspid Valve: Trivial tricuspid regurgitation is visualized. Estimated pulmonary artery systolic pressure is 38.8 mmHg assuming a right atrial pressure of 3 mmHg, which is consistent with borderline pulmonary hypertension.\par  Aortic Valve: The aortic valve was not well visualized.\par  Pulmonary Artery: The main pulmonary artery is normal in size.\par  Venous: The inferior vena cava is 1.8 cm. The inferior vena cava was normal sized, with respiratory size variation greater than 50%.\par  In comparison to the previous echocardiogram(s): Prior examinations are available and were reviewed for comparison purposes. Compared to the prior TTE study from 3/1/22, interval normalization of RV size/function.\par  \par  \par  Summary:\par   1. Left ventricular ejection fraction, by visual estimation, is 60 to 65%.\par   2. Normal global left ventricular systolic function.\par   3. Normal right ventricular size and function, TAPSE 2.0 cm.\par   4. Estimated pulmonary artery systolic pressure is 38.8 mmHg assuming a right atrial pressure of 3 mmHg, which is consistent with borderline pulmonary hypertension.\par   5. Trivial pericardial effusion posterior to the RA.\par   6. Compared to the prior TTE study from 3/1/22, interval normalization of RV size/function.\par  \par  Benjamín Peoples DO Electronically signed on 3/3/2022 at 1:26:48 PM\par  \par  \par

## 2024-03-30 NOTE — HISTORY OF PRESENT ILLNESS
[Never] : never [TextBox_4] : has been using dream machine 2 and in need of a new full face mask. now legally blind and is going to retire early Has Stargardt disease.  needs to get a new PCP. had another DVT after shoulder surgery, was on blood thinner for 1 year  CONNER compliant to CPAP.due for new machine DEC 2023  Prior DVT associated with trauma and then travel.  Had recc. of DVT saw vascular.  was told should stay on it but pt d/c on his own 8 months ago. never went to hematologist

## 2024-03-30 NOTE — ASSESSMENT
[FreeTextEntry1] : Obtain CPAP data. recommended hematologist and vascular evaluation.  Likely should restart anticoagulation. Continue present CPAP settings. gave ResMed air fit [id number] full face. getting new insurance and will call when gets it to come in for a 2-night HHS to replace cpap   35 minutes spent in evaluation management and review of studies.

## 2024-03-30 NOTE — DISCUSSION/SUMMARY
[FreeTextEntry1] :  CONNER by history compliant to CPAP Hyperlipidemia on therapy. Overweight. Status post PE with large clot burden.  Repeat echo without RV overload or dysfunction. DVT.  Recurrent.  Self discontinued anticoagulation.

## 2024-05-21 ENCOUNTER — APPOINTMENT (OUTPATIENT)
Dept: VASCULAR SURGERY | Facility: CLINIC | Age: 64
End: 2024-05-21
Payer: COMMERCIAL

## 2024-05-21 PROCEDURE — 99204 OFFICE O/P NEW MOD 45 MIN: CPT | Mod: 25

## 2024-05-21 PROCEDURE — 93970 EXTREMITY STUDY: CPT

## 2024-05-21 RX ORDER — APIXABAN 2.5 MG/1
2.5 TABLET, FILM COATED ORAL
Qty: 120 | Refills: 3 | Status: ACTIVE | COMMUNITY
Start: 2024-05-21 | End: 1900-01-01

## 2024-05-21 NOTE — PHYSICAL EXAM
[JVD] : no jugular venous distention  [Normal Breath Sounds] : Normal breath sounds [Normal Rate and Rhythm] : normal rate and rhythm [2+] : left 2+ [Ankle Swelling (On Exam)] : not present [Varicose Veins Of Lower Extremities] : not present [] : not present [Abdomen Tenderness] : ~T ~M No abdominal tenderness [No Rash or Lesion] : No rash or lesion [Alert] : alert [Calm] : calm [de-identified] : Appears well

## 2024-05-21 NOTE — ASSESSMENT
[FreeTextEntry1] : In the office today patient underwent a duplex study which shows no evidence of acute DVT.  There is evidence of chronic DVT in the right lower extremity.  There is deep insufficiency bilaterally.  At this time given the patient's history of multiple DVT along with pulmonary emboli patient should remain on lifelong anticoagulation.  Because the patient has been doing well for the past several months would recommend beginning Eliquis 2.5 mg twice daily.  I have also suggested the patient make a hematology appointment to better assess his long-term risk.  He will follow-up with me in 6 months and we will repeat his duplex study. [Arterial/Venous Disease] : arterial/venous disease [Medication Management] : medication management

## 2024-05-21 NOTE — HISTORY OF PRESENT ILLNESS
[FreeTextEntry1] : 64-year-old gentleman with a history of multiple DVT.  Patient first DVT occurred many years ago after a flight overseas.  At that time he was placed on anticoagulation.  He remained on anticoagulation for several months and it was then discontinued.  Patient was well for several years in between and underwent shoulder surgery.  With the shoulder surgery came immobility and the patient developed a DVT with a large pulmonary emboli.  He was placed on Eliquis.  At that time it was recommended he continue on lifelong anticoagulation.  Approximately 8 or 9 months ago patient discontinued anticoagulation when he ran out of Eliquis.  He has been functioning well.  He has no leg swelling and he can ambulate without difficulty.  Patient with a history of venous ablations.  It should also be noted that the patient never went to his hematology appointment.

## 2024-05-27 ENCOUNTER — RX RENEWAL (OUTPATIENT)
Age: 64
End: 2024-05-27

## 2024-05-27 RX ORDER — ATORVASTATIN CALCIUM 20 MG/1
20 TABLET, FILM COATED ORAL
Qty: 90 | Refills: 3 | Status: ACTIVE | COMMUNITY
Start: 2021-03-17 | End: 1900-01-01

## 2024-09-13 ENCOUNTER — RX RENEWAL (OUTPATIENT)
Age: 64
End: 2024-09-13

## 2024-09-13 RX ORDER — SERTRALINE HYDROCHLORIDE 50 MG/1
50 TABLET, FILM COATED ORAL
Qty: 90 | Refills: 1 | Status: ACTIVE | COMMUNITY
Start: 2024-09-13 | End: 1900-01-01

## 2025-01-25 ENCOUNTER — NON-APPOINTMENT (OUTPATIENT)
Age: 65
End: 2025-01-25

## 2025-01-30 ENCOUNTER — APPOINTMENT (OUTPATIENT)
Dept: VASCULAR SURGERY | Facility: CLINIC | Age: 65
End: 2025-01-30
Payer: COMMERCIAL

## 2025-01-30 PROCEDURE — 93970 EXTREMITY STUDY: CPT

## 2025-01-30 PROCEDURE — 99214 OFFICE O/P EST MOD 30 MIN: CPT

## 2025-05-12 NOTE — ASU PATIENT PROFILE, ADULT - BLOOD TRANSFUSION, PREVIOUS, PROFILE
Incoming medication refill request from [facility] for:       Medication: adapalene 0.3% gel apply every night  Last office visit date: 2/25/25  Medication Refill Protocol Failed.  Protocol approved due to: identified sig mis match, same prescription.     2/25/2025 Last office visit  Wt Readings from Last 1 Encounters:   02/25/25 85.3 kg (188 lb)        BP Readings from Last 2 Encounters:   02/25/25 124/74   12/12/24 111/59   ]    Lab Results   Component Value Date    SODIUM 140 12/03/2024    POTASSIUM 3.7 12/03/2024    CHLORIDE 105 12/03/2024    CO2 28 12/03/2024    BUN 10 12/03/2024    CREATININE 0.89 12/03/2024    GLUCOSE 83 12/03/2024     Hemoglobin A1C (%)   Date Value   11/28/2022 4.9     No results found for: \"TSH\"  Lab Results   Component Value Date    CHOLESTEROL 168 02/25/2025    HDL 51 02/25/2025    CALCLDL 80 02/25/2025    TRIGLYCERIDE 184 (H) 02/25/2025     Lab Results   Component Value Date    AST 23 12/03/2024    GPT 37 12/03/2024    ALKPT 126 (H) 12/03/2024    BILIRUBIN 0.5 12/03/2024          no

## 2025-06-09 NOTE — ED PROVIDER NOTE - COVID-19 ORDERING FACILITY
VBC follow up. Has upcoming appt with PCP and lab appt. Referral placed for diabetic eye exam.   [facility]

## 2025-09-10 ENCOUNTER — NON-APPOINTMENT (OUTPATIENT)
Age: 65
End: 2025-09-10